# Patient Record
Sex: FEMALE | Race: WHITE | NOT HISPANIC OR LATINO
[De-identification: names, ages, dates, MRNs, and addresses within clinical notes are randomized per-mention and may not be internally consistent; named-entity substitution may affect disease eponyms.]

---

## 2019-09-23 ENCOUNTER — APPOINTMENT (OUTPATIENT)
Dept: NEUROSURGERY | Facility: CLINIC | Age: 69
End: 2019-09-23
Payer: MEDICARE

## 2019-09-23 VITALS
HEIGHT: 64 IN | DIASTOLIC BLOOD PRESSURE: 79 MMHG | SYSTOLIC BLOOD PRESSURE: 172 MMHG | BODY MASS INDEX: 22.2 KG/M2 | OXYGEN SATURATION: 96 % | WEIGHT: 130 LBS | RESPIRATION RATE: 17 BRPM | HEART RATE: 89 BPM | TEMPERATURE: 98.2 F

## 2019-09-23 PROCEDURE — 99204 OFFICE O/P NEW MOD 45 MIN: CPT

## 2019-10-07 ENCOUNTER — APPOINTMENT (OUTPATIENT)
Dept: NEUROSURGERY | Facility: CLINIC | Age: 69
End: 2019-10-07
Payer: MEDICARE

## 2019-10-07 ENCOUNTER — OUTPATIENT (OUTPATIENT)
Dept: OUTPATIENT SERVICES | Facility: HOSPITAL | Age: 69
LOS: 1 days | End: 2019-10-07
Payer: MEDICARE

## 2019-10-07 ENCOUNTER — RX CHANGE (OUTPATIENT)
Age: 69
End: 2019-10-07

## 2019-10-07 VITALS
BODY MASS INDEX: 22.2 KG/M2 | TEMPERATURE: 98.4 F | DIASTOLIC BLOOD PRESSURE: 91 MMHG | HEIGHT: 64 IN | SYSTOLIC BLOOD PRESSURE: 174 MMHG | HEART RATE: 100 BPM | OXYGEN SATURATION: 95 % | RESPIRATION RATE: 18 BRPM | WEIGHT: 130 LBS

## 2019-10-07 DIAGNOSIS — M48.061 SPINAL STENOSIS, LUMBAR REGION WITHOUT NEUROGENIC CLAUDICATION: ICD-10-CM

## 2019-10-07 PROCEDURE — 72082 X-RAY EXAM ENTIRE SPI 2/3 VW: CPT | Mod: 26

## 2019-10-07 PROCEDURE — 99214 OFFICE O/P EST MOD 30 MIN: CPT

## 2019-10-07 PROCEDURE — 72082 X-RAY EXAM ENTIRE SPI 2/3 VW: CPT

## 2019-10-15 ENCOUNTER — RX RENEWAL (OUTPATIENT)
Age: 69
End: 2019-10-15

## 2019-10-28 ENCOUNTER — APPOINTMENT (OUTPATIENT)
Dept: NEUROSURGERY | Facility: CLINIC | Age: 69
End: 2019-10-28
Payer: MEDICARE

## 2019-10-28 ENCOUNTER — OUTPATIENT (OUTPATIENT)
Dept: OUTPATIENT SERVICES | Facility: HOSPITAL | Age: 69
LOS: 1 days | End: 2019-10-28
Payer: MEDICARE

## 2019-10-28 VITALS
DIASTOLIC BLOOD PRESSURE: 83 MMHG | HEIGHT: 64 IN | TEMPERATURE: 98 F | WEIGHT: 136.91 LBS | RESPIRATION RATE: 14 BRPM | OXYGEN SATURATION: 99 % | SYSTOLIC BLOOD PRESSURE: 155 MMHG | HEART RATE: 68 BPM

## 2019-10-28 DIAGNOSIS — Z86.39 PERSONAL HISTORY OF OTHER ENDOCRINE, NUTRITIONAL AND METABOLIC DISEASE: ICD-10-CM

## 2019-10-28 DIAGNOSIS — Z96.659 PRESENCE OF UNSPECIFIED ARTIFICIAL KNEE JOINT: Chronic | ICD-10-CM

## 2019-10-28 DIAGNOSIS — Z98.49 CATARACT EXTRACTION STATUS, UNSPECIFIED EYE: Chronic | ICD-10-CM

## 2019-10-28 DIAGNOSIS — M48.061 SPINAL STENOSIS, LUMBAR REGION WITHOUT NEUROGENIC CLAUDICATION: ICD-10-CM

## 2019-10-28 DIAGNOSIS — Z98.890 OTHER SPECIFIED POSTPROCEDURAL STATES: Chronic | ICD-10-CM

## 2019-10-28 DIAGNOSIS — Z29.9 ENCOUNTER FOR PROPHYLACTIC MEASURES, UNSPECIFIED: ICD-10-CM

## 2019-10-28 DIAGNOSIS — Z78.9 OTHER SPECIFIED HEALTH STATUS: ICD-10-CM

## 2019-10-28 DIAGNOSIS — Z01.818 ENCOUNTER FOR OTHER PREPROCEDURAL EXAMINATION: ICD-10-CM

## 2019-10-28 DIAGNOSIS — Z98.51 TUBAL LIGATION STATUS: Chronic | ICD-10-CM

## 2019-10-28 LAB
ANION GAP SERPL CALC-SCNC: 11 MMOL/L — SIGNIFICANT CHANGE UP (ref 5–17)
BLD GP AB SCN SERPL QL: NEGATIVE — SIGNIFICANT CHANGE UP
BUN SERPL-MCNC: 13 MG/DL — SIGNIFICANT CHANGE UP (ref 7–23)
CALCIUM SERPL-MCNC: 10 MG/DL — SIGNIFICANT CHANGE UP (ref 8.4–10.5)
CHLORIDE SERPL-SCNC: 105 MMOL/L — SIGNIFICANT CHANGE UP (ref 96–108)
CO2 SERPL-SCNC: 23 MMOL/L — SIGNIFICANT CHANGE UP (ref 22–31)
CREAT SERPL-MCNC: 0.73 MG/DL — SIGNIFICANT CHANGE UP (ref 0.5–1.3)
GLUCOSE SERPL-MCNC: 80 MG/DL — SIGNIFICANT CHANGE UP (ref 70–99)
HCT VFR BLD CALC: 41.1 % — SIGNIFICANT CHANGE UP (ref 34.5–45)
HGB BLD-MCNC: 12.8 G/DL — SIGNIFICANT CHANGE UP (ref 11.5–15.5)
MCHC RBC-ENTMCNC: 29.4 PG — SIGNIFICANT CHANGE UP (ref 27–34)
MCHC RBC-ENTMCNC: 31.1 GM/DL — LOW (ref 32–36)
MCV RBC AUTO: 94.3 FL — SIGNIFICANT CHANGE UP (ref 80–100)
PLATELET # BLD AUTO: 321 K/UL — SIGNIFICANT CHANGE UP (ref 150–400)
POTASSIUM SERPL-MCNC: 4.3 MMOL/L — SIGNIFICANT CHANGE UP (ref 3.5–5.3)
POTASSIUM SERPL-SCNC: 4.3 MMOL/L — SIGNIFICANT CHANGE UP (ref 3.5–5.3)
RBC # BLD: 4.36 M/UL — SIGNIFICANT CHANGE UP (ref 3.8–5.2)
RBC # FLD: 14.6 % — HIGH (ref 10.3–14.5)
RH IG SCN BLD-IMP: POSITIVE — SIGNIFICANT CHANGE UP
SODIUM SERPL-SCNC: 139 MMOL/L — SIGNIFICANT CHANGE UP (ref 135–145)
WBC # BLD: 6.27 K/UL — SIGNIFICANT CHANGE UP (ref 3.8–10.5)
WBC # FLD AUTO: 6.27 K/UL — SIGNIFICANT CHANGE UP (ref 3.8–10.5)

## 2019-10-28 PROCEDURE — 86901 BLOOD TYPING SEROLOGIC RH(D): CPT

## 2019-10-28 PROCEDURE — G0463: CPT

## 2019-10-28 PROCEDURE — 87641 MR-STAPH DNA AMP PROBE: CPT

## 2019-10-28 PROCEDURE — 87640 STAPH A DNA AMP PROBE: CPT

## 2019-10-28 PROCEDURE — 99214 OFFICE O/P EST MOD 30 MIN: CPT

## 2019-10-28 PROCEDURE — 86850 RBC ANTIBODY SCREEN: CPT

## 2019-10-28 PROCEDURE — 86900 BLOOD TYPING SEROLOGIC ABO: CPT

## 2019-10-28 PROCEDURE — 80048 BASIC METABOLIC PNL TOTAL CA: CPT

## 2019-10-28 PROCEDURE — 85027 COMPLETE CBC AUTOMATED: CPT

## 2019-10-28 RX ORDER — CEFAZOLIN SODIUM 1 G
2000 VIAL (EA) INJECTION ONCE
Refills: 0 | Status: DISCONTINUED | OUTPATIENT
Start: 2019-11-06 | End: 2019-11-07

## 2019-10-28 NOTE — H&P PST ADULT - ATTENDING COMMENTS
Pt has significant back pain radiating to right hip.  Neuro intact.  Imaging with multilevel spondylosis with severe stenosis L45 and bilateral foraminal stenosis with Gr 1 spondy.  For L45 instrumentation and fusion, laminectomy.  Discussed with pt R/B/A in office at length.  Pain has only been moderately controlled on medication.  She agrees to proceed.

## 2019-10-28 NOTE — H&P PST ADULT - HISTORY OF PRESENT ILLNESS
70 yo female.   68 y/o female 4 years ago woke up with twinge in back and has gotten progressively worse. Last three years had many shots with pain management. the last neurologist 2 years ago says she needed a neurosurgeon. She does not take pain med. Severe in right gluteus down to the hip with electrical shocks that make her jump. In SI joint. Only on right side. last imaging was 2018. Only one epidural injection worked, lasted from Apri 2019 to July 2019. Has not yet seen a spine surgeon until last visit as she was afraid, and now she is ready to entertain the option. She tried gabapentin, did not help. CBD oil did not help. Tylenol and ibuprofen, neither really helps. She is getting through her chores. Used to be a . In am, pain is 10/10, Worse when rain.   Today, pt returns for follow up visit reporting intolerable pain. Need more meds. She is here for review of films and discussion of surgery.    174/91 (pt says she is in pain), T98.4F, 95%, P 100  Pt is walking sl bent over, but symmetrically,  Exam is stable.    9/26/19  MRI on disc and ZP website: severe L45 stenosis with severe bilateral NF stenosis. Multilevel spondylotic disease, L45 spondy, mostly unchanged from January.  CT LS spine on ZP site: Multilevel degen disease, mostly with soft tissue foraminal stenosis L45 and severe canal stenosis L45  LS spine flex/ex on ZP site with no change in L45 spondy, but has few mm reduction on CT scan when lying supine.    Pt with L45 stenosis and multilevel spondylosis. Given mild reduction of L45 Gr I spondy on CT scan when pt is supine and in forced extension, pt would need fusion. Plan to review imaging further to danny in on levels and procedure, which will include pedicle screws and interbody fusion. Discussed with patient some of risk/benefits. She strongly desires to proceed with surgery. She still needs a 36 in film and can have it done here at [facility] today. She request scheduling of surgery soon and will look into week around Nov 5. She will return on the same day of her pretesting (she lives on [facility]) for further discussion of surgery. Pt cell: [phone] 68 yo female. c/o progressively worsening low back pain with radiculopathy to right hip.    68 y/o female 4 years ago woke up with twinge in back and has gotten progressively worse. Last three years had many shots with pain management. the last neurologist 2 years ago says she needed a neurosurgeon. She does not take pain med. Severe in right gluteus down to the hip with electrical shocks that make her jump. In SI joint. Only on right side. last imaging was 2018. Only one epidural injection worked, lasted from Apri 2019 to July 2019. Has not yet seen a spine surgeon until last visit as she was afraid, and now she is ready to entertain the option. She tried gabapentin, did not help. CBD oil did not help. Tylenol and ibuprofen, neither really helps. She is getting through her chores. Used to be a . In am, pain is 10/10, Worse when rain.   Today, pt returns for follow up visit reporting intolerable pain. Need more meds. She is here for review of films and discussion of surgery.    174/91 (pt says she is in pain), T98.4F, 95%, P 100  Pt is walking sl bent over, but symmetrically,  Exam is stable.    9/26/19  MRI on disc and ZP website: severe L45 stenosis with severe bilateral NF stenosis. Multilevel spondylotic disease, L45 spondy, mostly unchanged from January.  CT LS spine on  site: Multilevel degen disease, mostly with soft tissue foraminal stenosis L45 and severe canal stenosis L45  LS spine flex/ex on ZP site with no change in L45 spondy, but has few mm reduction on CT scan when lying supine.    Pt with L45 stenosis and multilevel spondylosis. Given mild reduction of L45 Gr I spondy on CT scan when pt is supine and in forced extension, pt would need fusion. Plan to review imaging further to danny in on levels and procedure, which will include pedicle screws and interbody fusion. Discussed with patient some of risk/benefits. She strongly desires to proceed with surgery. She still needs a 36 in film and can have it done here at [facility] today. She request scheduling of surgery soon and will look into week around Nov 5. She will return on the same day of her pretesting (she lives on [facility]) for further discussion of surgery. Pt cell: [phone] 68 yo female. c/o progressively worsening low back pain with radiculopathy to right hip. pain is constant, 7-10/10,     70 y/o female 4 years ago woke up with twinge in back and has gotten progressively worse. Last three years had many shots with pain management. the last neurologist 2 years ago says she needed a neurosurgeon. She does not take pain med. Severe in right gluteus down to the hip with electrical shocks that make her jump. In SI joint. Only on right side. last imaging was 2018. Only one epidural injection worked, lasted from Apri 2019 to July 2019. Has not yet seen a spine surgeon until last visit as she was afraid, and now she is ready to entertain the option. She tried gabapentin, did not help. CBD oil did not help. Tylenol and ibuprofen, neither really helps. She is getting through her chores. Used to be a . In am, pain is 10/10, Worse when rain.   Today, pt returns for follow up visit reporting intolerable pain. Need more meds. She is here for review of films and discussion of surgery.    174/91 (pt says she is in pain), T98.4F, 95%, P 100  Pt is walking sl bent over, but symmetrically,  Exam is stable.    9/26/19  MRI on disc and Z website: severe L45 stenosis with severe bilateral NF stenosis. Multilevel spondylotic disease, L45 spondy, mostly unchanged from January.  CT LS spine on  site: Multilevel degen disease, mostly with soft tissue foraminal stenosis L45 and severe canal stenosis L45  LS spine flex/ex on ZP site with no change in L45 spondy, but has few mm reduction on CT scan when lying supine.    Pt with L45 stenosis and multilevel spondylosis. Given mild reduction of L45 Gr I spondy on CT scan when pt is supine and in forced extension, pt would need fusion. Plan to review imaging further to danny in on levels and procedure, which will include pedicle screws and interbody fusion. Discussed with patient some of risk/benefits. She strongly desires to proceed with surgery. She still needs a 36 in film and can have it done here at [facility] today. She request scheduling of surgery soon and will look into week around Nov 5. She will return on the same day of her pretesting (she lives on [facility]) for further discussion of surgery. Pt cell: [phone] 68 yo female. c/o progressively worsening low back pain with radiculopathy to right hip. pain is constant, 6-10/10, aggravated with movements, "it's pain". MRI revealed severe L45 stenosis with multilevel spondylosis. now presents to PST scheduled for laminectomy and fusion.

## 2019-10-28 NOTE — H&P PST ADULT - ACTIVITY
walk 45 minutes daily, able to walk up 4 flights of stairs walk 45 minutes daily, able to walk up 4 flights of stairs, houework

## 2019-10-28 NOTE — H&P PST ADULT - ASSESSMENT

## 2019-10-28 NOTE — H&P PST ADULT - NSICDXPASTSURGICALHX_GEN_ALL_CORE_FT
PAST SURGICAL HISTORY:  H/O shoulder surgery surgical repair for muscle tear, right  shoulder    H/O total knee replacement bilateral    H/O tubal ligation     S/P cataract extraction

## 2019-10-28 NOTE — H&P PST ADULT - NSICDXPROBLEM_GEN_ALL_CORE_FT
PROBLEM DIAGNOSES  Problem: Lumbar spinal stenosis  Assessment and Plan: L4-5 laminectomy  posterior fusion  instrumentation interbody fusion    Problem: Need for prophylactic measure  Assessment and Plan: The Caprini score indicates this patient is at risk for a VTE event (score 3-5).  Most surgical patients in this group would benefit from pharmacologic prophylaxis.  The surgical team will determine the balance between VTE risk and bleeding risk

## 2019-10-28 NOTE — H&P PST ADULT - NSANTHOSAYNRD_GEN_A_CORE
No. RODRI screening performed.  STOP BANG Legend: 0-2 = LOW Risk; 3-4 = INTERMEDIATE Risk; 5-8 = HIGH Risk

## 2019-10-29 VITALS
HEIGHT: 64 IN | BODY MASS INDEX: 22.2 KG/M2 | SYSTOLIC BLOOD PRESSURE: 153 MMHG | OXYGEN SATURATION: 98 % | DIASTOLIC BLOOD PRESSURE: 89 MMHG | WEIGHT: 130 LBS | TEMPERATURE: 98.2 F | HEART RATE: 88 BPM | RESPIRATION RATE: 17 BRPM

## 2019-10-29 LAB
MRSA PCR RESULT.: SIGNIFICANT CHANGE UP
S AUREUS DNA NOSE QL NAA+PROBE: SIGNIFICANT CHANGE UP

## 2019-10-30 NOTE — HISTORY OF PRESENT ILLNESS
[FreeTextEntry1] : Ms. SCOTTIE HOGAN is a 70 yo female with PMH of OS on b/l knee, HLD (no meds, diet control only), chronic back pain who was seen in the office since September 2019 for chronic back pain with failed conservative management. MRI L-spine showed multilevel lumbar stenosis most severe @ L4-L5 with spondylolisthesis at the same level and severe b/l foraminal stenosis. She returns today for pre op consultation and states her previous pain remained unchanged since the last office visit and she c/o occasional b/l toes numbness causing. She denies saddle anesthesia, bowel/bladder dysfunction or gait disturbance, LE weakness. She just had pre surgical testing today and scheduled for the surgery this Wednesday. She reports that she has not been taking blood thinner and stopped taking NSAIDs a week ago.  The pain benefit of Tramadol and gabapentin did not last but a few days.\par \par Pervious history of illness\par Patient reports  4 years ago woke up with twinge in back and has gotten progressively worse.  Last three years had many shots with pain management.  the last neurologist 2 years ago says she needed a neurosurgeon.  She does not take pain med.  Severe in right  gluteus down to the hip with electrical shocks that make her jump.  In SI joint.  Only on right side.  last imaging was 2018.  Only one epidural injection worked, lasted from Apri 2019 to July 2019.  Has not yet seen a spine surgeon until today as she was afraid, and now she is ready to entertain the option.  She tried gabapentin, did not help. CBD oil did not help.  Tylenol and ibuprofen, neither really helps.  She is getting through her chores. Used to be a \par In am, pain is 10/10, right now 7/10.  Worse when rain.

## 2019-10-30 NOTE — PHYSICAL EXAM
[Simms] : Simms's sign was not demonstrated [Straight-Leg Raise Test - Left] : straight leg raise of the left leg was negative [Straight-Leg Raise Test - Right] : straight leg raise  of the right leg was negative [FreeTextEntry1] : m

## 2019-10-30 NOTE — ASSESSMENT
[FreeTextEntry1] : This is a 70 yo female with severe lumbar central/bilateral foraminal stenosis with spondylolisthesis.  i have discussed her case and reviewed the films. Given her failed conservative management and long standing pain causing significant daily activity and image finding, surgical intervention (L4-L5 laminectomy, facetectomy, interbody fusion, posterior instrumentation and fusion) can be beneficial. I discussed with the patient in detail for risk (bleeding, infection, CSF leak which may extend hospital stay, transient LE weakness 2/2 nerve stretch, post op pain, possible adjacent disease which may guarantee additional surgery in the future), benefit and alternative to the surgery. I also explained to the patient that purpose of the surgery is to prevent further neurological damages/worsening symptoms and her pain may not be completely relieved. Patient, at first was taken aback by the complexity of the case and the various potential risks, but we discussed the need to explain procedure through informed consent and set expectations prior to embarking upon surgical management.  She verbalizes understanding and would like to proceed the surgery.\par

## 2019-10-30 NOTE — RESULTS/DATA
[FreeTextEntry1] : MRI 9/26/19 outside on Disc ([facility]) was unchanged from January.\par 10/7/19 scoliosis \par SVA: 56.7 mm (0/+)\par PI: 36.47 deg\par LL: 43.8 deg\par PT: 78.9 deg (++) ??\par PI-LL: -7.3 deg (0)\par

## 2019-11-05 ENCOUNTER — TRANSCRIPTION ENCOUNTER (OUTPATIENT)
Age: 69
End: 2019-11-05

## 2019-11-06 ENCOUNTER — INPATIENT (INPATIENT)
Facility: HOSPITAL | Age: 69
LOS: 3 days | Discharge: HOME | DRG: 454 | End: 2019-11-10
Attending: NEUROLOGICAL SURGERY | Admitting: NEUROLOGICAL SURGERY
Payer: MEDICARE

## 2019-11-06 ENCOUNTER — APPOINTMENT (OUTPATIENT)
Dept: NEUROSURGERY | Facility: CLINIC | Age: 69
End: 2019-11-06
Payer: MEDICARE

## 2019-11-06 ENCOUNTER — RESULT REVIEW (OUTPATIENT)
Age: 69
End: 2019-11-06

## 2019-11-06 VITALS
DIASTOLIC BLOOD PRESSURE: 88 MMHG | HEIGHT: 64 IN | HEART RATE: 87 BPM | OXYGEN SATURATION: 100 % | SYSTOLIC BLOOD PRESSURE: 169 MMHG | TEMPERATURE: 98 F | RESPIRATION RATE: 16 BRPM | WEIGHT: 136.91 LBS

## 2019-11-06 DIAGNOSIS — Z98.51 TUBAL LIGATION STATUS: Chronic | ICD-10-CM

## 2019-11-06 DIAGNOSIS — Z98.890 OTHER SPECIFIED POSTPROCEDURAL STATES: Chronic | ICD-10-CM

## 2019-11-06 DIAGNOSIS — M48.061 SPINAL STENOSIS, LUMBAR REGION WITHOUT NEUROGENIC CLAUDICATION: ICD-10-CM

## 2019-11-06 DIAGNOSIS — Z98.49 CATARACT EXTRACTION STATUS, UNSPECIFIED EYE: Chronic | ICD-10-CM

## 2019-11-06 DIAGNOSIS — Z96.659 PRESENCE OF UNSPECIFIED ARTIFICIAL KNEE JOINT: Chronic | ICD-10-CM

## 2019-11-06 LAB
ANION GAP SERPL CALC-SCNC: 12 MMOL/L — SIGNIFICANT CHANGE UP (ref 5–17)
BASOPHILS # BLD AUTO: 0 K/UL — SIGNIFICANT CHANGE UP (ref 0–0.2)
BASOPHILS NFR BLD AUTO: 0 % — SIGNIFICANT CHANGE UP (ref 0–2)
BUN SERPL-MCNC: 16 MG/DL — SIGNIFICANT CHANGE UP (ref 7–23)
CALCIUM SERPL-MCNC: 8.8 MG/DL — SIGNIFICANT CHANGE UP (ref 8.4–10.5)
CHLORIDE SERPL-SCNC: 104 MMOL/L — SIGNIFICANT CHANGE UP (ref 96–108)
CO2 SERPL-SCNC: 21 MMOL/L — LOW (ref 22–31)
CREAT SERPL-MCNC: 0.7 MG/DL — SIGNIFICANT CHANGE UP (ref 0.5–1.3)
EOSINOPHIL # BLD AUTO: 0 K/UL — SIGNIFICANT CHANGE UP (ref 0–0.5)
EOSINOPHIL NFR BLD AUTO: 0 % — SIGNIFICANT CHANGE UP (ref 0–6)
GAS PNL BLDA: SIGNIFICANT CHANGE UP
GLUCOSE SERPL-MCNC: 145 MG/DL — HIGH (ref 70–99)
HCT VFR BLD CALC: 27.1 % — LOW (ref 34.5–45)
HGB BLD-MCNC: 8.7 G/DL — LOW (ref 11.5–15.5)
IMM GRANULOCYTES NFR BLD AUTO: 0.5 % — SIGNIFICANT CHANGE UP (ref 0–1.5)
LYMPHOCYTES # BLD AUTO: 0.63 K/UL — LOW (ref 1–3.3)
LYMPHOCYTES # BLD AUTO: 7.9 % — LOW (ref 13–44)
MCHC RBC-ENTMCNC: 29.3 PG — SIGNIFICANT CHANGE UP (ref 27–34)
MCHC RBC-ENTMCNC: 32.1 GM/DL — SIGNIFICANT CHANGE UP (ref 32–36)
MCV RBC AUTO: 91.2 FL — SIGNIFICANT CHANGE UP (ref 80–100)
MONOCYTES # BLD AUTO: 0.29 K/UL — SIGNIFICANT CHANGE UP (ref 0–0.9)
MONOCYTES NFR BLD AUTO: 3.6 % — SIGNIFICANT CHANGE UP (ref 2–14)
NEUTROPHILS # BLD AUTO: 6.99 K/UL — SIGNIFICANT CHANGE UP (ref 1.8–7.4)
NEUTROPHILS NFR BLD AUTO: 88 % — HIGH (ref 43–77)
NRBC # BLD: 0 /100 WBCS — SIGNIFICANT CHANGE UP (ref 0–0)
PLATELET # BLD AUTO: 216 K/UL — SIGNIFICANT CHANGE UP (ref 150–400)
POTASSIUM SERPL-MCNC: 4.3 MMOL/L — SIGNIFICANT CHANGE UP (ref 3.5–5.3)
POTASSIUM SERPL-SCNC: 4.3 MMOL/L — SIGNIFICANT CHANGE UP (ref 3.5–5.3)
RBC # BLD: 2.97 M/UL — LOW (ref 3.8–5.2)
RBC # FLD: 13.8 % — SIGNIFICANT CHANGE UP (ref 10.3–14.5)
RH IG SCN BLD-IMP: POSITIVE — SIGNIFICANT CHANGE UP
SODIUM SERPL-SCNC: 137 MMOL/L — SIGNIFICANT CHANGE UP (ref 135–145)
WBC # BLD: 7.95 K/UL — SIGNIFICANT CHANGE UP (ref 3.8–10.5)
WBC # FLD AUTO: 7.95 K/UL — SIGNIFICANT CHANGE UP (ref 3.8–10.5)

## 2019-11-06 PROCEDURE — 22840 INSERT SPINE FIXATION DEVICE: CPT

## 2019-11-06 PROCEDURE — 63047 LAM FACETEC & FORAMOT LUMBAR: CPT | Mod: 59

## 2019-11-06 PROCEDURE — 22633 ARTHRD CMBN 1NTRSPC LUMBAR: CPT

## 2019-11-06 PROCEDURE — 88304 TISSUE EXAM BY PATHOLOGIST: CPT | Mod: 26

## 2019-11-06 PROCEDURE — 88311 DECALCIFY TISSUE: CPT | Mod: 26

## 2019-11-06 PROCEDURE — 22853 INSJ BIOMECHANICAL DEVICE: CPT

## 2019-11-06 RX ORDER — DIAZEPAM 5 MG
5 TABLET ORAL EVERY 8 HOURS
Refills: 0 | Status: DISCONTINUED | OUTPATIENT
Start: 2019-11-06 | End: 2019-11-08

## 2019-11-06 RX ORDER — ONDANSETRON 8 MG/1
4 TABLET, FILM COATED ORAL EVERY 6 HOURS
Refills: 0 | Status: DISCONTINUED | OUTPATIENT
Start: 2019-11-06 | End: 2019-11-06

## 2019-11-06 RX ORDER — SENNA PLUS 8.6 MG/1
2 TABLET ORAL AT BEDTIME
Refills: 0 | Status: DISCONTINUED | OUTPATIENT
Start: 2019-11-06 | End: 2019-11-10

## 2019-11-06 RX ORDER — GABAPENTIN 400 MG/1
400 CAPSULE ORAL THREE TIMES A DAY
Refills: 0 | Status: DISCONTINUED | OUTPATIENT
Start: 2019-11-06 | End: 2019-11-10

## 2019-11-06 RX ORDER — ACETAMINOPHEN 500 MG
650 TABLET ORAL EVERY 6 HOURS
Refills: 0 | Status: DISCONTINUED | OUTPATIENT
Start: 2019-11-06 | End: 2019-11-10

## 2019-11-06 RX ORDER — LIDOCAINE HCL 20 MG/ML
0.2 VIAL (ML) INJECTION ONCE
Refills: 0 | Status: DISCONTINUED | OUTPATIENT
Start: 2019-11-06 | End: 2019-11-06

## 2019-11-06 RX ORDER — FOLIC ACID 0.8 MG
1 TABLET ORAL DAILY
Refills: 0 | Status: DISCONTINUED | OUTPATIENT
Start: 2019-11-06 | End: 2019-11-10

## 2019-11-06 RX ORDER — ONDANSETRON 8 MG/1
4 TABLET, FILM COATED ORAL EVERY 6 HOURS
Refills: 0 | Status: DISCONTINUED | OUTPATIENT
Start: 2019-11-06 | End: 2019-11-07

## 2019-11-06 RX ORDER — HYDROMORPHONE HYDROCHLORIDE 2 MG/ML
30 INJECTION INTRAMUSCULAR; INTRAVENOUS; SUBCUTANEOUS
Refills: 0 | Status: DISCONTINUED | OUTPATIENT
Start: 2019-11-06 | End: 2019-11-07

## 2019-11-06 RX ORDER — SODIUM CHLORIDE 9 MG/ML
3 INJECTION INTRAMUSCULAR; INTRAVENOUS; SUBCUTANEOUS EVERY 8 HOURS
Refills: 0 | Status: DISCONTINUED | OUTPATIENT
Start: 2019-11-06 | End: 2019-11-06

## 2019-11-06 RX ORDER — CEFAZOLIN SODIUM 1 G
2000 VIAL (EA) INJECTION EVERY 8 HOURS
Refills: 0 | Status: COMPLETED | OUTPATIENT
Start: 2019-11-06 | End: 2019-11-07

## 2019-11-06 RX ORDER — NALOXONE HYDROCHLORIDE 4 MG/.1ML
0.1 SPRAY NASAL
Refills: 0 | Status: DISCONTINUED | OUTPATIENT
Start: 2019-11-06 | End: 2019-11-07

## 2019-11-06 RX ORDER — ACETAMINOPHEN 500 MG
1000 TABLET ORAL ONCE
Refills: 0 | Status: COMPLETED | OUTPATIENT
Start: 2019-11-06 | End: 2019-11-06

## 2019-11-06 RX ORDER — HYDROMORPHONE HYDROCHLORIDE 2 MG/ML
0.5 INJECTION INTRAMUSCULAR; INTRAVENOUS; SUBCUTANEOUS
Refills: 0 | Status: DISCONTINUED | OUTPATIENT
Start: 2019-11-06 | End: 2019-11-07

## 2019-11-06 RX ORDER — HYDROMORPHONE HYDROCHLORIDE 2 MG/ML
0.25 INJECTION INTRAMUSCULAR; INTRAVENOUS; SUBCUTANEOUS
Refills: 0 | Status: DISCONTINUED | OUTPATIENT
Start: 2019-11-06 | End: 2019-11-07

## 2019-11-06 RX ORDER — POLYETHYLENE GLYCOL 3350 17 G/17G
17 POWDER, FOR SOLUTION ORAL DAILY
Refills: 0 | Status: DISCONTINUED | OUTPATIENT
Start: 2019-11-06 | End: 2019-11-09

## 2019-11-06 RX ORDER — CHLORHEXIDINE GLUCONATE 213 G/1000ML
1 SOLUTION TOPICAL ONCE
Refills: 0 | Status: DISCONTINUED | OUTPATIENT
Start: 2019-11-06 | End: 2019-11-07

## 2019-11-06 RX ORDER — SODIUM CHLORIDE 9 MG/ML
1000 INJECTION INTRAMUSCULAR; INTRAVENOUS; SUBCUTANEOUS
Refills: 0 | Status: DISCONTINUED | OUTPATIENT
Start: 2019-11-06 | End: 2019-11-07

## 2019-11-06 RX ADMIN — HYDROMORPHONE HYDROCHLORIDE 0.25 MILLIGRAM(S): 2 INJECTION INTRAMUSCULAR; INTRAVENOUS; SUBCUTANEOUS at 20:37

## 2019-11-06 RX ADMIN — SODIUM CHLORIDE 75 MILLILITER(S): 9 INJECTION INTRAMUSCULAR; INTRAVENOUS; SUBCUTANEOUS at 19:44

## 2019-11-06 RX ADMIN — HYDROMORPHONE HYDROCHLORIDE 30 MILLILITER(S): 2 INJECTION INTRAMUSCULAR; INTRAVENOUS; SUBCUTANEOUS at 20:15

## 2019-11-06 RX ADMIN — HYDROMORPHONE HYDROCHLORIDE 0.25 MILLIGRAM(S): 2 INJECTION INTRAMUSCULAR; INTRAVENOUS; SUBCUTANEOUS at 19:45

## 2019-11-06 RX ADMIN — HYDROMORPHONE HYDROCHLORIDE 0.25 MILLIGRAM(S): 2 INJECTION INTRAMUSCULAR; INTRAVENOUS; SUBCUTANEOUS at 20:56

## 2019-11-06 RX ADMIN — Medication 5 MILLIGRAM(S): at 20:06

## 2019-11-06 RX ADMIN — Medication 400 MILLIGRAM(S): at 19:30

## 2019-11-06 RX ADMIN — HYDROMORPHONE HYDROCHLORIDE 0.25 MILLIGRAM(S): 2 INJECTION INTRAMUSCULAR; INTRAVENOUS; SUBCUTANEOUS at 19:20

## 2019-11-06 RX ADMIN — Medication 100 MILLIGRAM(S): at 21:02

## 2019-11-06 RX ADMIN — GABAPENTIN 400 MILLIGRAM(S): 400 CAPSULE ORAL at 21:50

## 2019-11-06 RX ADMIN — Medication 1000 MILLIGRAM(S): at 19:47

## 2019-11-06 RX ADMIN — HYDROMORPHONE HYDROCHLORIDE 0.25 MILLIGRAM(S): 2 INJECTION INTRAMUSCULAR; INTRAVENOUS; SUBCUTANEOUS at 19:47

## 2019-11-06 RX ADMIN — HYDROMORPHONE HYDROCHLORIDE 0.25 MILLIGRAM(S): 2 INJECTION INTRAMUSCULAR; INTRAVENOUS; SUBCUTANEOUS at 20:00

## 2019-11-07 DIAGNOSIS — D62 ACUTE POSTHEMORRHAGIC ANEMIA: ICD-10-CM

## 2019-11-07 DIAGNOSIS — K59.00 CONSTIPATION, UNSPECIFIED: ICD-10-CM

## 2019-11-07 DIAGNOSIS — R52 PAIN, UNSPECIFIED: ICD-10-CM

## 2019-11-07 DIAGNOSIS — M47.816 SPONDYLOSIS WITHOUT MYELOPATHY OR RADICULOPATHY, LUMBAR REGION: ICD-10-CM

## 2019-11-07 LAB
ANION GAP SERPL CALC-SCNC: 14 MMOL/L — SIGNIFICANT CHANGE UP (ref 5–17)
BUN SERPL-MCNC: 13 MG/DL — SIGNIFICANT CHANGE UP (ref 7–23)
CALCIUM SERPL-MCNC: 8.8 MG/DL — SIGNIFICANT CHANGE UP (ref 8.4–10.5)
CHLORIDE SERPL-SCNC: 105 MMOL/L — SIGNIFICANT CHANGE UP (ref 96–108)
CO2 SERPL-SCNC: 21 MMOL/L — LOW (ref 22–31)
CREAT SERPL-MCNC: 0.61 MG/DL — SIGNIFICANT CHANGE UP (ref 0.5–1.3)
GLUCOSE SERPL-MCNC: 158 MG/DL — HIGH (ref 70–99)
HCT VFR BLD CALC: 26.3 % — LOW (ref 34.5–45)
HGB BLD-MCNC: 8.7 G/DL — LOW (ref 11.5–15.5)
MCHC RBC-ENTMCNC: 29.7 PG — SIGNIFICANT CHANGE UP (ref 27–34)
MCHC RBC-ENTMCNC: 33.1 GM/DL — SIGNIFICANT CHANGE UP (ref 32–36)
MCV RBC AUTO: 89.8 FL — SIGNIFICANT CHANGE UP (ref 80–100)
NRBC # BLD: 0 /100 WBCS — SIGNIFICANT CHANGE UP (ref 0–0)
PLATELET # BLD AUTO: 243 K/UL — SIGNIFICANT CHANGE UP (ref 150–400)
POTASSIUM SERPL-MCNC: 4.3 MMOL/L — SIGNIFICANT CHANGE UP (ref 3.5–5.3)
POTASSIUM SERPL-SCNC: 4.3 MMOL/L — SIGNIFICANT CHANGE UP (ref 3.5–5.3)
RBC # BLD: 2.93 M/UL — LOW (ref 3.8–5.2)
RBC # FLD: 13.7 % — SIGNIFICANT CHANGE UP (ref 10.3–14.5)
SODIUM SERPL-SCNC: 140 MMOL/L — SIGNIFICANT CHANGE UP (ref 135–145)
WBC # BLD: 10.56 K/UL — HIGH (ref 3.8–10.5)
WBC # FLD AUTO: 10.56 K/UL — HIGH (ref 3.8–10.5)

## 2019-11-07 PROCEDURE — 99223 1ST HOSP IP/OBS HIGH 75: CPT

## 2019-11-07 RX ORDER — TRAMADOL HYDROCHLORIDE 50 MG/1
50 TABLET ORAL EVERY 6 HOURS
Refills: 0 | Status: DISCONTINUED | OUTPATIENT
Start: 2019-11-07 | End: 2019-11-07

## 2019-11-07 RX ORDER — DICLOFENAC SODIUM 30 MG/G
1 GEL TOPICAL
Qty: 0 | Refills: 0 | DISCHARGE

## 2019-11-07 RX ORDER — HYDROMORPHONE HYDROCHLORIDE 2 MG/ML
0.5 INJECTION INTRAMUSCULAR; INTRAVENOUS; SUBCUTANEOUS EVERY 4 HOURS
Refills: 0 | Status: DISCONTINUED | OUTPATIENT
Start: 2019-11-07 | End: 2019-11-07

## 2019-11-07 RX ORDER — TRAMADOL HYDROCHLORIDE 50 MG/1
50 TABLET ORAL EVERY 4 HOURS
Refills: 0 | Status: DISCONTINUED | OUTPATIENT
Start: 2019-11-07 | End: 2019-11-07

## 2019-11-07 RX ORDER — LACTULOSE 10 G/15ML
20 SOLUTION ORAL
Refills: 0 | Status: DISCONTINUED | OUTPATIENT
Start: 2019-11-07 | End: 2019-11-08

## 2019-11-07 RX ORDER — TRAMADOL HYDROCHLORIDE 50 MG/1
50 TABLET ORAL EVERY 4 HOURS
Refills: 0 | Status: DISCONTINUED | OUTPATIENT
Start: 2019-11-07 | End: 2019-11-10

## 2019-11-07 RX ORDER — HYDROMORPHONE HYDROCHLORIDE 2 MG/ML
1 INJECTION INTRAMUSCULAR; INTRAVENOUS; SUBCUTANEOUS EVERY 4 HOURS
Refills: 0 | Status: DISCONTINUED | OUTPATIENT
Start: 2019-11-07 | End: 2019-11-07

## 2019-11-07 RX ORDER — ACETAMINOPHEN 500 MG
2 TABLET ORAL
Qty: 0 | Refills: 0 | DISCHARGE

## 2019-11-07 RX ORDER — OXYCODONE HYDROCHLORIDE 5 MG/1
2.5 TABLET ORAL EVERY 4 HOURS
Refills: 0 | Status: DISCONTINUED | OUTPATIENT
Start: 2019-11-07 | End: 2019-11-07

## 2019-11-07 RX ORDER — TRAMADOL HYDROCHLORIDE 50 MG/1
25 TABLET ORAL EVERY 4 HOURS
Refills: 0 | Status: DISCONTINUED | OUTPATIENT
Start: 2019-11-07 | End: 2019-11-07

## 2019-11-07 RX ORDER — TRAMADOL HYDROCHLORIDE 50 MG/1
25 TABLET ORAL EVERY 4 HOURS
Refills: 0 | Status: DISCONTINUED | OUTPATIENT
Start: 2019-11-07 | End: 2019-11-10

## 2019-11-07 RX ORDER — PREGABALIN 225 MG/1
1 CAPSULE ORAL
Qty: 0 | Refills: 0 | DISCHARGE

## 2019-11-07 RX ORDER — OXYCODONE HYDROCHLORIDE 5 MG/1
5 TABLET ORAL EVERY 4 HOURS
Refills: 0 | Status: DISCONTINUED | OUTPATIENT
Start: 2019-11-07 | End: 2019-11-07

## 2019-11-07 RX ORDER — GABAPENTIN 400 MG/1
400 CAPSULE ORAL
Qty: 0 | Refills: 0 | DISCHARGE

## 2019-11-07 RX ORDER — ASCORBIC ACID 60 MG
1 TABLET,CHEWABLE ORAL
Qty: 0 | Refills: 0 | DISCHARGE

## 2019-11-07 RX ORDER — ENOXAPARIN SODIUM 100 MG/ML
40 INJECTION SUBCUTANEOUS
Refills: 0 | Status: DISCONTINUED | OUTPATIENT
Start: 2019-11-07 | End: 2019-11-08

## 2019-11-07 RX ADMIN — Medication 1 TABLET(S): at 11:45

## 2019-11-07 RX ADMIN — TRAMADOL HYDROCHLORIDE 50 MILLIGRAM(S): 50 TABLET ORAL at 15:00

## 2019-11-07 RX ADMIN — SENNA PLUS 2 TABLET(S): 8.6 TABLET ORAL at 22:07

## 2019-11-07 RX ADMIN — Medication 1 TABLET(S): at 14:12

## 2019-11-07 RX ADMIN — Medication 650 MILLIGRAM(S): at 12:15

## 2019-11-07 RX ADMIN — Medication 1 MILLIGRAM(S): at 11:45

## 2019-11-07 RX ADMIN — GABAPENTIN 400 MILLIGRAM(S): 400 CAPSULE ORAL at 06:32

## 2019-11-07 RX ADMIN — ENOXAPARIN SODIUM 40 MILLIGRAM(S): 100 INJECTION SUBCUTANEOUS at 18:05

## 2019-11-07 RX ADMIN — TRAMADOL HYDROCHLORIDE 50 MILLIGRAM(S): 50 TABLET ORAL at 22:37

## 2019-11-07 RX ADMIN — Medication 100 MILLIGRAM(S): at 04:40

## 2019-11-07 RX ADMIN — GABAPENTIN 400 MILLIGRAM(S): 400 CAPSULE ORAL at 22:06

## 2019-11-07 RX ADMIN — Medication 1 TABLET(S): at 06:32

## 2019-11-07 RX ADMIN — GABAPENTIN 400 MILLIGRAM(S): 400 CAPSULE ORAL at 14:12

## 2019-11-07 RX ADMIN — TRAMADOL HYDROCHLORIDE 50 MILLIGRAM(S): 50 TABLET ORAL at 18:30

## 2019-11-07 RX ADMIN — TRAMADOL HYDROCHLORIDE 50 MILLIGRAM(S): 50 TABLET ORAL at 14:12

## 2019-11-07 RX ADMIN — Medication 650 MILLIGRAM(S): at 11:44

## 2019-11-07 RX ADMIN — HYDROMORPHONE HYDROCHLORIDE 30 MILLILITER(S): 2 INJECTION INTRAMUSCULAR; INTRAVENOUS; SUBCUTANEOUS at 03:42

## 2019-11-07 RX ADMIN — TRAMADOL HYDROCHLORIDE 50 MILLIGRAM(S): 50 TABLET ORAL at 22:07

## 2019-11-07 RX ADMIN — Medication 1 TABLET(S): at 22:07

## 2019-11-07 RX ADMIN — Medication 5 MILLIGRAM(S): at 03:37

## 2019-11-07 RX ADMIN — HYDROMORPHONE HYDROCHLORIDE 30 MILLILITER(S): 2 INJECTION INTRAMUSCULAR; INTRAVENOUS; SUBCUTANEOUS at 07:54

## 2019-11-07 RX ADMIN — HYDROMORPHONE HYDROCHLORIDE 30 MILLILITER(S): 2 INJECTION INTRAMUSCULAR; INTRAVENOUS; SUBCUTANEOUS at 06:47

## 2019-11-07 RX ADMIN — TRAMADOL HYDROCHLORIDE 50 MILLIGRAM(S): 50 TABLET ORAL at 18:04

## 2019-11-07 NOTE — PHYSICAL THERAPY INITIAL EVALUATION ADULT - PLANNED THERAPY INTERVENTIONS, PT EVAL
gait training/transfer training/GOAL: Pt will independently negotiate stairs with 1 handrail with step over step pattern in 2wks./strengthening/balance training

## 2019-11-07 NOTE — PROVIDER CONTACT NOTE (OTHER) - SITUATION
Pt. refused to have guevara removed., Pt was informed that guevara would then be removed in am.  Pt. stated that she does not want it removed in the morning either.

## 2019-11-07 NOTE — PHYSICAL THERAPY INITIAL EVALUATION ADULT - ADDITIONAL COMMENTS
Pt lives with spouse in private home, 3 steps to enter, 4 flights of stairs inside home. Prior to admission pt was independent with all functional mobility. Pt states  will assist her on d/c home.

## 2019-11-07 NOTE — PROGRESS NOTE ADULT - SUBJECTIVE AND OBJECTIVE BOX
Day 1 of Anesthesia Pain Management Service    SUBJECTIVE: Doing well. I want to get rid of this    Pain Scale Score:	[X] Refer to charted pain scores    THERAPY:    [ ] IV PCA Morphine		        [ ] 5 mg/mL	[ ] 1 mg/mL  [X] IV PCA Hydromorphone	[ ] 5 mg/mL	[X] 1 mg/mL  [ ] IV PCA Fentanyl		        [ ] 50 micrograms/mL    Demand dose: 0.2 mg     Lockout: 6 minutes   Continuous Rate: 0 mg/hr  4 Hour Limit: 4 mg    MEDICATIONS  (STANDING):  calcium carbonate 1250 mG  + Vitamin D (OsCal 500 + D) 1 Tablet(s) Oral three times a day  ceFAZolin   IVPB 2000 milliGRAM(s) IV Intermittent once  chlorhexidine 2% Cloths 1 Application(s) Topical once  folic acid 1 milliGRAM(s) Oral daily  gabapentin 400 milliGRAM(s) Oral three times a day  HYDROmorphone PCA (1 mG/mL) 30 milliLiter(s) PCA Continuous PCA Continuous  multivitamin 1 Tablet(s) Oral daily  polyethylene glycol 3350 17 Gram(s) Oral daily  senna 2 Tablet(s) Oral at bedtime  sodium chloride 0.9%. 1000 milliLiter(s) (75 mL/Hr) IV Continuous <Continuous>    MEDICATIONS  (PRN):  acetaminophen   Tablet .. 650 milliGRAM(s) Oral every 6 hours PRN Mild Pain (1 - 3)  diazepam    Tablet 5 milliGRAM(s) Oral every 8 hours PRN Muscle spasms  HYDROmorphone PCA (1 mG/mL) Rescue Clinician Bolus 0.5 milliGRAM(s) IV Push every 15 minutes PRN for Pain Scale GREATER THAN 6  naloxone Injectable 0.1 milliGRAM(s) IV Push every 3 minutes PRN For ANY of the following changes in patient status:  A. RR LESS THAN 10 breaths per minute, B. Oxygen saturation LESS THAN 90%, C. Sedation score of 6  ondansetron Injectable 4 milliGRAM(s) IV Push every 6 hours PRN Nausea      OBJECTIVE:    Sedation Score:	[ X] Alert	 [ ] Drowsy 	[ ] Arousable	[ ] Asleep	[ ] Unresponsive    Side Effects:	[X ] None	[ ] Nausea	[ ] Vomiting	[ ] Pruritus  		[ ] Other:    Vital Signs Last 24 Hrs  T(C): 37 (07 Nov 2019 08:47), Max: 37.1 (07 Nov 2019 05:00)  T(F): 98.6 (07 Nov 2019 08:47), Max: 98.8 (07 Nov 2019 05:00)  HR: 88 (07 Nov 2019 08:47) (74 - 114)  BP: 125/56 (07 Nov 2019 08:47) (117/62 - 169/88)  BP(mean): 91 (07 Nov 2019 04:00) (85 - 113)  RR: 16 (07 Nov 2019 08:47) (16 - 18)  SpO2: 98% (07 Nov 2019 08:47) (95% - 100%)    ASSESSMENT/ PLAN    Therapy to  be:               [  ] Continued   [X ] Discontinued   [ X] Changed to PRN Analgesics    Documentation and Verification of current medications:   [X] Done	[ ] Not done, not eligible    Comments: Day 1 of Anesthesia Pain Management Service    SUBJECTIVE: Doing well. I want to get rid of this    Pain Scale Score:	[X] Refer to charted pain scores    THERAPY:    [ ] IV PCA Morphine		        [ ] 5 mg/mL	[ ] 1 mg/mL  [X] IV PCA Hydromorphone	[ ] 5 mg/mL	[X] 1 mg/mL  [ ] IV PCA Fentanyl		        [ ] 50 micrograms/mL    Demand dose: 0.2 mg     Lockout: 6 minutes   Continuous Rate: 0 mg/hr  4 Hour Limit: 4 mg    MEDICATIONS  (STANDING):  calcium carbonate 1250 mG  + Vitamin D (OsCal 500 + D) 1 Tablet(s) Oral three times a day  ceFAZolin   IVPB 2000 milliGRAM(s) IV Intermittent once  chlorhexidine 2% Cloths 1 Application(s) Topical once  folic acid 1 milliGRAM(s) Oral daily  gabapentin 400 milliGRAM(s) Oral three times a day  HYDROmorphone PCA (1 mG/mL) 30 milliLiter(s) PCA Continuous PCA Continuous  multivitamin 1 Tablet(s) Oral daily  polyethylene glycol 3350 17 Gram(s) Oral daily  senna 2 Tablet(s) Oral at bedtime  sodium chloride 0.9%. 1000 milliLiter(s) (75 mL/Hr) IV Continuous <Continuous>    MEDICATIONS  (PRN):  acetaminophen   Tablet .. 650 milliGRAM(s) Oral every 6 hours PRN Mild Pain (1 - 3)  diazepam    Tablet 5 milliGRAM(s) Oral every 8 hours PRN Muscle spasms  HYDROmorphone PCA (1 mG/mL) Rescue Clinician Bolus 0.5 milliGRAM(s) IV Push every 15 minutes PRN for Pain Scale GREATER THAN 6  naloxone Injectable 0.1 milliGRAM(s) IV Push every 3 minutes PRN For ANY of the following changes in patient status:  A. RR LESS THAN 10 breaths per minute, B. Oxygen saturation LESS THAN 90%, C. Sedation score of 6  ondansetron Injectable 4 milliGRAM(s) IV Push every 6 hours PRN Nausea      OBJECTIVE:    Sedation Score:	[ X] Alert	 [ ] Drowsy 	[ ] Arousable	[ ] Asleep	[ ] Unresponsive    Side Effects:	[X ] None	[ ] Nausea	[ ] Vomiting	[ ] Pruritus  		[ ] Other:    Vital Signs Last 24 Hrs  T(C): 37 (07 Nov 2019 08:47), Max: 37.1 (07 Nov 2019 05:00)  T(F): 98.6 (07 Nov 2019 08:47), Max: 98.8 (07 Nov 2019 05:00)  HR: 88 (07 Nov 2019 08:47) (74 - 114)  BP: 125/56 (07 Nov 2019 08:47) (117/62 - 169/88)  BP(mean): 91 (07 Nov 2019 04:00) (85 - 113)  RR: 16 (07 Nov 2019 08:47) (16 - 18)  SpO2: 98% (07 Nov 2019 08:47) (95% - 100%)    ASSESSMENT/ PLAN    Therapy to  be:               [  ] Continued   [X ] Discontinued   [ X] Changed to PRN Analgesics    Documentation and Verification of current medications:   [X] Done	[ ] Not done, not eligible    Comments: Minimal use. Requesting PCA be d\c'd Transition to po analgesics prn

## 2019-11-07 NOTE — CONSULT NOTE ADULT - SUBJECTIVE AND OBJECTIVE BOX
Varun Tyson  Pager [phone]  Office [phone]    CC:   HPI:  70 yo female. c/o progressively worsening low back pain with radiculopathy to right hip. pain is constant, 6-10/10, aggravated with movements, "it's pain". MRI revealed severe L45 stenosis with multilevel spondylosis. now presents to PST scheduled for laminectomy and fusion. (28 Oct 2019 11:00)      PAST MEDICAL & SURGICAL HISTORY:  Lumbar spondylosis  S/P cataract extraction  H/O shoulder surgery: surgical repair for muscle tear, right  shoulder  H/O tubal ligation  H/O total knee replacement: bilateral      Review of Systems:   CONSTITUTIONAL: No fever, weight loss, or fatigue  EYES: No eye pain, visual disturbances, or discharge  ENMT:  No difficulty hearing, tinnitus, vertigo; No sinus or throat pain  NECK: No pain or stiffness  BREASTS: No pain, masses, or nipple discharge  RESPIRATORY: No cough, wheezing, chills or hemoptysis; No shortness of breath  CARDIOVASCULAR: No chest pain, palpitations, dizziness, or leg swelling  GASTROINTESTINAL: No abdominal or epigastric pain. No nausea, vomiting, or hematemesis; No diarrhea or constipation. No melena or hematochezia.  GENITOURINARY: No dysuria, frequency, hematuria, or incontinence  NEUROLOGICAL: No headaches, memory loss, loss of strength, numbness, or tremors  SKIN: No itching, burning, rashes, or lesions   LYMPH NODES: No enlarged glands  ENDOCRINE: No heat or cold intolerance; No hair loss  MUSCULOSKELETAL: No joint pain or swelling; No muscle, back, or extremity pain  PSYCHIATRIC: No depression, anxiety, mood swings, or difficulty sleeping  HEME/LYMPH: No easy bruising, or bleeding gums  ALLERY AND IMMUNOLOGIC: No hives or eczema    Allergies    No Known Drug Allergies  shellfish (Unknown)    Intolerances        Social History:     FAMILY HISTORY:      MEDICATIONS  (STANDING):  calcium carbonate 1250 mG  + Vitamin D (OsCal 500 + D) 1 Tablet(s) Oral three times a day  enoxaparin Injectable 40 milliGRAM(s) SubCutaneous <User Schedule>  folic acid 1 milliGRAM(s) Oral daily  gabapentin 400 milliGRAM(s) Oral three times a day  multivitamin 1 Tablet(s) Oral daily  polyethylene glycol 3350 17 Gram(s) Oral daily  senna 2 Tablet(s) Oral at bedtime    MEDICATIONS  (PRN):  acetaminophen   Tablet .. 650 milliGRAM(s) Oral every 6 hours PRN Mild Pain (1 - 3)  diazepam    Tablet 5 milliGRAM(s) Oral every 8 hours PRN Muscle spasms  traMADol 50 milliGRAM(s) Oral every 4 hours PRN Severe Pain (7 - 10)  traMADol 25 milliGRAM(s) Oral every 4 hours PRN Moderate Pain (4 - 6)      Vital Signs Last 24 Hrs  T(C): 37.3 (07 Nov 2019 16:10), Max: 37.3 (07 Nov 2019 16:10)  T(F): 99.2 (07 Nov 2019 16:10), Max: 99.2 (07 Nov 2019 16:10)  HR: 104 (07 Nov 2019 16:10) (68 - 114)  BP: 127/58 (07 Nov 2019 16:10) (117/62 - 155/70)  BP(mean): 91 (07 Nov 2019 04:00) (85 - 113)  RR: 16 (07 Nov 2019 16:10) (16 - 18)  SpO2: 99% (07 Nov 2019 16:10) (95% - 100%)  CAPILLARY BLOOD GLUCOSE        I&O's Summary    06 Nov 2019 07:01  -  07 Nov 2019 07:00  --------------------------------------------------------  IN: 1625 mL / OUT: 1420 mL / NET: 205 mL    07 Nov 2019 07:01  -  07 Nov 2019 16:39  --------------------------------------------------------  IN: 740 mL / OUT: 680 mL / NET: 60 mL        PHYSICAL EXAM:  GENERAL: NAD, well-developed  HEAD:  Atraumatic, Normocephalic  EYES: EOMI, PERRLA, conjunctiva and sclera clear  NECK: Supple, No JVD  CHEST/LUNG: Clear to auscultation bilaterally; No wheeze  HEART: Regular rate and rhythm; No murmurs, rubs, or gallops  ABDOMEN: Soft, Nontender, Nondistended; Bowel sounds present  EXTREMITIES:  2+ Peripheral Pulses, No clubbing, cyanosis, or edema  PSYCH: AAOx3  NEUROLOGY: non-focal  SKIN: No rashes or lesions    LABS:                        8.7    10.56 )-----------( 243      ( 07 Nov 2019 04:02 )             26.3     11-07    140  |  105  |  13  ----------------------------<  158<H>  4.3   |  21<L>  |  0.61    Ca    8.8      07 Nov 2019 04:02                RADIOLOGY & ADDITIONAL TESTS:    Imaging Personally Reviewed:    Consultant(s) Notes Reviewed:      Care Discussed with Consultants/Other Providers: Varun Tyson  Pager [phone]  Office [phone]    CC: S/P L4-5 PLIF  HPI:  70 yo female h/o spondylosis S/P L4-5 PLIF on 19.  cc. Pt reports that Tramadol is not controlling surgical site pain- pt requesting trial of Oxycodone 2.5 mg understanding that 2.5 will likely be too low. Pt denies fatigue/WILLARD/light-headedness/CP/f/c/r. Constipated. No N/V/abd pain      PAST MEDICAL & SURGICAL HISTORY:  Heart murmur since young age  Lumbar spondylosis  S/P cataract extraction  H/O shoulder surgery: surgical repair for muscle tear, right  shoulder  H/O tubal ligation  H/O total knee replacement: bilateral      Review of Systems:   CONSTITUTIONAL: No fever, weight loss, or fatigue  EYES: No eye pain, visual disturbances, or discharge  ENMT:  No difficulty hearing, tinnitus, vertigo; No sinus or throat pain  NECK: No pain or stiffness  BREASTS: No pain, masses, or nipple discharge  RESPIRATORY: No cough, wheezing, chills or hemoptysis; No shortness of breath  CARDIOVASCULAR: No chest pain, palpitations, dizziness, or leg swelling  GASTROINTESTINAL: No abdominal or epigastric pain. No nausea, vomiting, or hematemesis; No diarrhea or constipation. No melena or hematochezia.  GENITOURINARY: No dysuria, frequency, hematuria, or incontinence  NEUROLOGICAL: No headaches, memory loss, loss of strength, numbness, or tremors  SKIN: No itching, burning, rashes, or lesions   LYMPH NODES: No enlarged glands  ENDOCRINE: No heat or cold intolerance; No hair loss  MUSCULOSKELETAL: per HPI  PSYCHIATRIC: No depression, anxiety, mood swings, or difficulty sleeping  HEME/LYMPH: No easy bruising, or bleeding gums  ALLERY AND IMMUNOLOGIC: No hives or eczema    Allergies    No Known Drug Allergies  shellfish (Unknown)    Social History:   No smoking, etoh, illegal drugs    FAMILY HISTORY:  Mother  of liver ca 54  Father  of stroke 62      MEDICATIONS  (STANDING):  calcium carbonate 1250 mG  + Vitamin D (OsCal 500 + D) 1 Tablet(s) Oral three times a day  enoxaparin Injectable 40 milliGRAM(s) SubCutaneous <User Schedule>  folic acid 1 milliGRAM(s) Oral daily  gabapentin 400 milliGRAM(s) Oral three times a day  multivitamin 1 Tablet(s) Oral daily  polyethylene glycol 3350 17 Gram(s) Oral daily  senna 2 Tablet(s) Oral at bedtime    MEDICATIONS  (PRN):  acetaminophen   Tablet .. 650 milliGRAM(s) Oral every 6 hours PRN Mild Pain (1 - 3)  diazepam    Tablet 5 milliGRAM(s) Oral every 8 hours PRN Muscle spasms  traMADol 50 milliGRAM(s) Oral every 4 hours PRN Severe Pain (7 - 10)  traMADol 25 milliGRAM(s) Oral every 4 hours PRN Moderate Pain (4 - 6)      Vital Signs Last 24 Hrs  T(C): 37.3 (2019 16:10), Max: 37.3 (2019 16:10)  T(F): 99.2 (2019 16:10), Max: 99.2 (2019 16:10)  HR: 104 (2019 16:10) (68 - 114)  BP: 127/58 (2019 16:10) (117/62 - 155/70)  BP(mean): 91 (2019 04:00) (85 - 113)  RR: 16 (2019 16:10) (16 - 18)  SpO2: 99% (2019 16:10) (95% - 100%)  CAPILLARY BLOOD GLUCOSE        I&O's Summary    2019 07:  -  2019 07:00  --------------------------------------------------------  IN: 1625 mL / OUT: 1420 mL / NET: 205 mL    2019 07:01  -  2019 16:39  --------------------------------------------------------  IN: 740 mL / OUT: 680 mL / NET: 60 mL        PHYSICAL EXAM:  GENERAL: NAD, well-developed  HEAD:  Atraumatic, Normocephalic  EYES: EOMI, PERRLA, conjunctiva and sclera clear  NECK: Supple, No JVD  CHEST/LUNG: Clear to auscultation bilaterally; No wheeze  HEART: Regular rate and rhythm; mild AD  ABDOMEN: Soft, Nontender, Nondistended; Bowel sounds present  EXTREMITIES:  2+ Peripheral Pulses, No clubbing, cyanosis, or edema  PSYCH: AAOx3  NEUROLOGY: non-focal  SKIN: No rashes     LABS:                        8.7    10.56 )-----------( 243      ( 2019 04:02 )             26.3     11-07    140  |  105  |  13  ----------------------------<  158<H>  4.3   |  21<L>  |  0.61    Ca    8.8      2019 04:02                RADIOLOGY & ADDITIONAL TESTS:    Imaging Personally Reviewed:    Consultant(s) Notes Reviewed:      Care Discussed with Consultants/Other Providers: neurosurg regard pain mgmt Varun Tyson  Pager [phone]  Office [phone]    CC: S/P L4-5 PLIF  HPI:  70 yo female h/o spondylosis S/P L4-5 PLIF on 19.  cc. Pt reports that Tramadol is not controlling surgical site pain- pt requesting trial of Oxycodone 2.5 mg understanding that 2.5 will likely be too low. Pt denies fatigue/WILLARD/light-headedness/CP/f/c/r. Constipated. No N/V/abd pain      PAST MEDICAL & SURGICAL HISTORY:  Heart murmur since young age  Lumbar spondylosis  S/P cataract extraction  H/O shoulder surgery: surgical repair for muscle tear, right  shoulder  H/O tubal ligation  H/O total knee replacement: bilateral      Review of Systems:   CONSTITUTIONAL: No fever, weight loss, or fatigue  EYES: No eye pain, visual disturbances, or discharge  ENMT:  No difficulty hearing, tinnitus, vertigo; No sinus or throat pain  NECK: No pain or stiffness  BREASTS: No pain, masses, or nipple discharge  RESPIRATORY: No cough, wheezing, chills or hemoptysis; No shortness of breath  CARDIOVASCULAR: No chest pain, palpitations, dizziness, or leg swelling  GASTROINTESTINAL: No abdominal or epigastric pain. No nausea, vomiting, or hematemesis; No diarrhea or constipation. No melena or hematochezia.  GENITOURINARY: No dysuria, frequency, hematuria, or incontinence  NEUROLOGICAL: No headaches, memory loss, loss of strength, numbness, or tremors  SKIN: No itching, burning, rashes, or lesions   LYMPH NODES: No enlarged glands  ENDOCRINE: No heat or cold intolerance; No hair loss  MUSCULOSKELETAL: per HPI  PSYCHIATRIC: No depression, anxiety, mood swings, or difficulty sleeping  HEME/LYMPH: No easy bruising, or bleeding gums  ALLERY AND IMMUNOLOGIC: No hives or eczema    Allergies    Codeine- itching  shellfish (Unknown)    Social History:   No smoking, etoh, illegal drugs    FAMILY HISTORY:  Mother  of liver ca 54  Father  of stroke 62      MEDICATIONS  (STANDING):  calcium carbonate 1250 mG  + Vitamin D (OsCal 500 + D) 1 Tablet(s) Oral three times a day  enoxaparin Injectable 40 milliGRAM(s) SubCutaneous <User Schedule>  folic acid 1 milliGRAM(s) Oral daily  gabapentin 400 milliGRAM(s) Oral three times a day  multivitamin 1 Tablet(s) Oral daily  polyethylene glycol 3350 17 Gram(s) Oral daily  senna 2 Tablet(s) Oral at bedtime    MEDICATIONS  (PRN):  acetaminophen   Tablet .. 650 milliGRAM(s) Oral every 6 hours PRN Mild Pain (1 - 3)  diazepam    Tablet 5 milliGRAM(s) Oral every 8 hours PRN Muscle spasms  traMADol 50 milliGRAM(s) Oral every 4 hours PRN Severe Pain (7 - 10)  traMADol 25 milliGRAM(s) Oral every 4 hours PRN Moderate Pain (4 - 6)      Vital Signs Last 24 Hrs  T(C): 37.3 (2019 16:10), Max: 37.3 (2019 16:10)  T(F): 99.2 (2019 16:10), Max: 99.2 (2019 16:10)  HR: 104 (2019 16:10) (68 - 114)  BP: 127/58 (2019 16:10) (117/62 - 155/70)  BP(mean): 91 (2019 04:00) (85 - 113)  RR: 16 (2019 16:10) (16 - 18)  SpO2: 99% (2019 16:10) (95% - 100%)  CAPILLARY BLOOD GLUCOSE        I&O's Summary    2019 07:  -  2019 07:00  --------------------------------------------------------  IN: 1625 mL / OUT: 1420 mL / NET: 205 mL    2019 07:  -  2019 16:39  --------------------------------------------------------  IN: 740 mL / OUT: 680 mL / NET: 60 mL        PHYSICAL EXAM:  GENERAL: NAD, well-developed  HEAD:  Atraumatic, Normocephalic  EYES: EOMI, PERRLA, conjunctiva and sclera clear  NECK: Supple, No JVD  CHEST/LUNG: Clear to auscultation bilaterally; No wheeze  HEART: Regular rate and rhythm; mild AD  ABDOMEN: Soft, Nontender, Nondistended; Bowel sounds present  EXTREMITIES:  2+ Peripheral Pulses, No clubbing, cyanosis, or edema  PSYCH: AAOx3  NEUROLOGY: non-focal  SKIN: No rashes     LABS:                        8.7    10.56 )-----------( 243      ( 2019 04:02 )             26.3     11-07    140  |  105  |  13  ----------------------------<  158<H>  4.3   |  21<L>  |  0.61    Ca    8.8      2019 04:02                RADIOLOGY & ADDITIONAL TESTS:    Imaging Personally Reviewed:    Consultant(s) Notes Reviewed:      Care Discussed with Consultants/Other Providers: neurosurg regard pain mgmt Varun Tyson  Pager [phone]  Office [phone]    CC: S/P L4-5 PLIF  HPI:  68 yo female h/o spondylosis S/P L4-5 PLIF on 19.  cc. Pt reports that Tramadol is not controlling surgical site pain- pt requesting trial of Oxycodone 2.5 mg understanding that 2.5 will likely be too low. Pt denies fatigue/WILLARD/light-headedness/CP/f/c/r. Constipated. No N/V/abd pain      PAST MEDICAL & SURGICAL HISTORY:  Heart murmur since young age  Lumbar spondylosis  S/P cataract extraction  H/O shoulder surgery: surgical repair for muscle tear, right  shoulder  H/O tubal ligation  H/O total knee replacement: bilateral      Review of Systems:   CONSTITUTIONAL: No fever, weight loss, or fatigue  EYES: No eye pain, visual disturbances, or discharge  ENMT:  No difficulty hearing, tinnitus, vertigo; No sinus or throat pain  NECK: No pain or stiffness  BREASTS: No pain, masses, or nipple discharge  RESPIRATORY: No cough, wheezing, chills or hemoptysis; No shortness of breath  CARDIOVASCULAR: No chest pain, palpitations, dizziness, or leg swelling  GASTROINTESTINAL: No abdominal or epigastric pain. No nausea, vomiting, or hematemesis; No diarrhea or constipation. No melena or hematochezia.  GENITOURINARY: No dysuria, frequency, hematuria, or incontinence  NEUROLOGICAL: No headaches, memory loss, loss of strength, numbness, or tremors  SKIN: No itching, burning, rashes, or lesions   LYMPH NODES: No enlarged glands  ENDOCRINE: No heat or cold intolerance; No hair loss  MUSCULOSKELETAL: per HPI  PSYCHIATRIC: No depression, anxiety, mood swings, or difficulty sleeping  HEME/LYMPH: No easy bruising, or bleeding gums  ALLERY AND IMMUNOLOGIC: No hives or eczema    Allergies    Codeine- itching (no hives)  shellfish (Unknown)    Social History:   No smoking, etoh, illegal drugs    FAMILY HISTORY:  Mother  of liver ca 54  Father  of stroke 62      MEDICATIONS  (STANDING):  calcium carbonate 1250 mG  + Vitamin D (OsCal 500 + D) 1 Tablet(s) Oral three times a day  enoxaparin Injectable 40 milliGRAM(s) SubCutaneous <User Schedule>  folic acid 1 milliGRAM(s) Oral daily  gabapentin 400 milliGRAM(s) Oral three times a day  multivitamin 1 Tablet(s) Oral daily  polyethylene glycol 3350 17 Gram(s) Oral daily  senna 2 Tablet(s) Oral at bedtime    MEDICATIONS  (PRN):  acetaminophen   Tablet .. 650 milliGRAM(s) Oral every 6 hours PRN Mild Pain (1 - 3)  diazepam    Tablet 5 milliGRAM(s) Oral every 8 hours PRN Muscle spasms  traMADol 50 milliGRAM(s) Oral every 4 hours PRN Severe Pain (7 - 10)  traMADol 25 milliGRAM(s) Oral every 4 hours PRN Moderate Pain (4 - 6)      Vital Signs Last 24 Hrs  T(C): 37.3 (2019 16:10), Max: 37.3 (2019 16:10)  T(F): 99.2 (2019 16:10), Max: 99.2 (2019 16:10)  HR: 104 (2019 16:10) (68 - 114)  BP: 127/58 (2019 16:10) (117/62 - 155/70)  BP(mean): 91 (2019 04:00) (85 - 113)  RR: 16 (2019 16:10) (16 - 18)  SpO2: 99% (2019 16:10) (95% - 100%)  CAPILLARY BLOOD GLUCOSE        I&O's Summary    2019 07:  -  2019 07:00  --------------------------------------------------------  IN: 1625 mL / OUT: 1420 mL / NET: 205 mL    2019 07:  -  2019 16:39  --------------------------------------------------------  IN: 740 mL / OUT: 680 mL / NET: 60 mL        PHYSICAL EXAM:  GENERAL: NAD, well-developed  HEAD:  Atraumatic, Normocephalic  EYES: EOMI, PERRLA, conjunctiva and sclera clear  NECK: Supple, No JVD  CHEST/LUNG: Clear to auscultation bilaterally; No wheeze  HEART: Regular rate and rhythm; mild AD  ABDOMEN: Soft, Nontender, Nondistended; Bowel sounds present  EXTREMITIES:  2+ Peripheral Pulses, No clubbing, cyanosis, or edema  PSYCH: AAOx3  NEUROLOGY: non-focal  SKIN: No rashes     LABS:                        8.7    10.56 )-----------( 243      ( 2019 04:02 )             26.3     11-07    140  |  105  |  13  ----------------------------<  158<H>  4.3   |  21<L>  |  0.61    Ca    8.8      2019 04:02                RADIOLOGY & ADDITIONAL TESTS:    Imaging Personally Reviewed:    Consultant(s) Notes Reviewed:      Care Discussed with Consultants/Other Providers: neurosurg regard pain mgmt

## 2019-11-07 NOTE — CHART NOTE - NSCHARTNOTEFT_GEN_A_CORE
Measure fit and deliver [place] custom fit LSO. Donned for fit orthosis fit well. Reviewed application skin precautions and care with patient and spouse. Written instructions and contact information given.  To notify office with any issues questions or concerns.  Saqib OCHOA  [facility]  [phone]

## 2019-11-07 NOTE — PROGRESS NOTE ADULT - SUBJECTIVE AND OBJECTIVE BOX
SUBJECTIVE: Patient seen and examined at bedside.  In NAD.  States that she has no pain and has not used PCA for several hours.  Wants to ambulate.      CHIEF COMPLAINT: back pain    OVERNIGHT EVENTS: post op was in PACU over night, on PCA    Vital Signs Last 24 Hrs  T(C): 37 (07 Nov 2019 08:47), Max: 37.1 (07 Nov 2019 05:00)  T(F): 98.6 (07 Nov 2019 08:47), Max: 98.8 (07 Nov 2019 05:00)  HR: 88 (07 Nov 2019 08:47) (74 - 114)  BP: 125/56 (07 Nov 2019 08:47) (117/62 - 169/88)  BP(mean): 91 (07 Nov 2019 04:00) (85 - 113)  RR: 16 (07 Nov 2019 08:47) (16 - 18)  SpO2: 98% (07 Nov 2019 08:47) (95% - 100%)    PHYSICAL EXAM:    General: No Acute Distress     Neurological: Alert and oriented to self, place and date.  Speech clear, follow commands.  RENEE.  Motor 5/5 B/L LE.  Sensory grossly intact to light touch.      Pulmonary: Clear to Auscultation, No Rales, No Rhonchi, No Wheezes     Cardiovascular: S1, S2, Regular Rate and Rhythm     Gastrointestinal: Soft, Nontender, Nondistended     Incision: dressing in place, clean and dry.    LABS:                        8.7    10.56 )-----------( 243      ( 07 Nov 2019 04:02 )             26.3    11-07    140  |  105  |  13  ----------------------------<  158<H>  4.3   |  21<L>  |  0.61    Ca    8.8      07 Nov 2019 04:02          11-06 @ 07:01  -  11-07 @ 07:00  --------------------------------------------------------  IN: 1625 mL / OUT: 1420 mL / NET: 205 mL    11-07 @ 07:01  -  11-07 @ 09:36  --------------------------------------------------------  IN: 360 mL / OUT: 0 mL / NET: 360 mL      DRAINS: +HMV - 245 CC    MEDICATIONS:  Antibiotics:    Neuro:  acetaminophen   Tablet .. 650 milliGRAM(s) Oral every 6 hours PRN Mild Pain (1 - 3)  diazepam    Tablet 5 milliGRAM(s) Oral every 8 hours PRN Muscle spasms  gabapentin 400 milliGRAM(s) Oral three times a day  traMADol 50 milliGRAM(s) Oral every 4 hours PRN Severe Pain (7 - 10)  traMADol 25 milliGRAM(s) Oral every 4 hours PRN Moderate Pain (4 - 6)    Cardiac:    Pulm:    GI/:  polyethylene glycol 3350 17 Gram(s) Oral daily  senna 2 Tablet(s) Oral at bedtime    Other:   calcium carbonate 1250 mG  + Vitamin D (OsCal 500 + D) 1 Tablet(s) Oral three times a day  folic acid 1 milliGRAM(s) Oral daily  multivitamin 1 Tablet(s) Oral daily    DIET: [X] Regular [] CCD [] Renal [] Puree [] Dysphagia [] Tube Feeds:     IMAGING: none

## 2019-11-07 NOTE — CONSULT NOTE ADULT - PROBLEM SELECTOR RECOMMENDATION 3
Tramadol switched to Oxycodone per pt request As tramadol not working to control pain and given Codeine allergy will retry Hydromorphone as pt had no reaction using PCA

## 2019-11-07 NOTE — CHART NOTE - NSCHARTNOTEFT_GEN_A_CORE
CAPRINI SCORE [CLOT] Score on Admission for     AGE RELATED RISK FACTORS                                                       MOBILITY RELATED FACTORS  [ ] Age 41-60 years                                            (1 Point)                  [ ] Bed rest                                                        (1 Point)  [X ] Age: 61-74 years                                           (2 Points)                 [ ] Plaster cast                                                   (2 Points)  [ ] Age= 75 years                                              (3 Points)                 [ ] Bed bound for more than 72 hours                 (2 Points)    DISEASE RELATED RISK FACTORS                                               GENDER SPECIFIC FACTORS  [ ] Edema in the lower extremities                       (1 Point)                  [ ] Pregnancy                                                     (1 Point)  [ ] Varicose veins                                               (1 Point)                  [ ] Post-partum < 6 weeks                                   (1 Point)             [ ] BMI > 25 Kg/m2                                            (1 Point)                  [ ] Hormonal therapy  or oral contraception          (1 Point)                 [ ] Sepsis (in the previous month)                        (1 Point)                  [ ] History of pregnancy complications                 (1 point)  [ ] Pneumonia or serious lung disease                                               [ ] Unexplained or recurrent                     (1 Point)           (in the previous month)                               (1 Point)  [ ] Abnormal pulmonary function test                     (1 Point)                 SURGERY RELATED RISK FACTORS (include planned surgeries)  [ ] Acute myocardial infarction                              (1 Point)                 [ ]  Section                                             (1 Point)  [ ] Congestive heart failure (in the previous month)  (1 Point)         [ ] Minor surgery                                                  (1 Point)   [ ] Inflammatory bowel disease                             (1 Point)                 [ ] Arthroscopic surgery                                        (2 Points)  [ ] Central venous access                                      (2 Points)                [X ] General surgery lasting more than 45 minutes   (2 Points)       [ ] Stroke (in the previous month)                          (5 Points)               [ ] Elective arthroplasty                                         (5 Points)            [ ] current or past malignancy                              (2 Points)                                                                                                       HEMATOLOGY RELATED FACTORS                                                 TRAUMA RELATED RISK FACTORS  [ ] Prior episodes of VTE                                     (3 Points)                [ ] Fracture of the hip, pelvis, or leg                       (5 Points)  [ ] Positive family history for VTE                         (3 Points)                 [ ] Acute spinal cord injury (in the previous month)  (5 Points)  [ ] Prothrombin 31320 A                                     (3 Points)                 [ ] Paralysis  (less than 1 month)                             (5 Points)  [ ] Factor V Leiden                                             (3 Points)                  [ ] Multiple Trauma within 1 month                        (5 Points)  [ ] Lupus anticoagulants                                     (3 Points)                                                           [ ] Anticardiolipin antibodies                               (3 Points)                                                       [ ] High homocysteine in the blood                      (3 Points)                                             [ ] Other congenital or acquired thrombophilia      (3 Points)                                                [ ] Heparin induced thrombocytopenia                  (3 Points)                                          Total Score [  3       ]    Risk:  Very low 0   Low 1 to 2   Moderate 3 to 4   High =5       VTE Prophylasix Recommednations:  [ X] mechanical pneumatic compression devices                                      [ ] contraindicated: _____________________  [ X] chemo prophylasix                                                                                   [ ] contraindicated _____________________    **** HIGH LIKELIHOOD DVT PRESENT ON ADMISSION  [ ] (please order LE dopplers within 24 hours of admission)

## 2019-11-07 NOTE — PHYSICAL THERAPY INITIAL EVALUATION ADULT - PERTINENT HX OF CURRENT PROBLEM, REHAB EVAL
68 yo F c/o progressively worsening low back pain with radiculopathy to R hip. pain is constant, 6-10/10, aggravated with movements, "it's pain". MRI revealed severe L45 stenosis with multilevel spondylosis. now presents to PST scheduled for laminectomy and fusion.

## 2019-11-08 ENCOUNTER — TRANSCRIPTION ENCOUNTER (OUTPATIENT)
Age: 69
End: 2019-11-08

## 2019-11-08 LAB
BLD GP AB SCN SERPL QL: NEGATIVE — SIGNIFICANT CHANGE UP
HCT VFR BLD CALC: 21.1 % — LOW (ref 34.5–45)
HGB BLD-MCNC: 6.9 G/DL — CRITICAL LOW (ref 11.5–15.5)
MCHC RBC-ENTMCNC: 30.1 PG — SIGNIFICANT CHANGE UP (ref 27–34)
MCHC RBC-ENTMCNC: 32.7 GM/DL — SIGNIFICANT CHANGE UP (ref 32–36)
MCV RBC AUTO: 92.1 FL — SIGNIFICANT CHANGE UP (ref 80–100)
PLATELET # BLD AUTO: 178 K/UL — SIGNIFICANT CHANGE UP (ref 150–400)
RBC # BLD: 2.29 M/UL — LOW (ref 3.8–5.2)
RBC # FLD: 14.4 % — SIGNIFICANT CHANGE UP (ref 10.3–14.5)
RH IG SCN BLD-IMP: POSITIVE — SIGNIFICANT CHANGE UP
WBC # BLD: 8.13 K/UL — SIGNIFICANT CHANGE UP (ref 3.8–10.5)
WBC # FLD AUTO: 8.13 K/UL — SIGNIFICANT CHANGE UP (ref 3.8–10.5)

## 2019-11-08 PROCEDURE — 99233 SBSQ HOSP IP/OBS HIGH 50: CPT

## 2019-11-08 RX ORDER — METHOCARBAMOL 500 MG/1
500 TABLET, FILM COATED ORAL EVERY 8 HOURS
Refills: 0 | Status: COMPLETED | OUTPATIENT
Start: 2019-11-08 | End: 2019-11-09

## 2019-11-08 RX ORDER — DIAZEPAM 5 MG
2 TABLET ORAL EVERY 6 HOURS
Refills: 0 | Status: DISCONTINUED | OUTPATIENT
Start: 2019-11-08 | End: 2019-11-10

## 2019-11-08 RX ORDER — LACTULOSE 10 G/15ML
20 SOLUTION ORAL
Refills: 0 | Status: DISCONTINUED | OUTPATIENT
Start: 2019-11-08 | End: 2019-11-10

## 2019-11-08 RX ADMIN — SENNA PLUS 2 TABLET(S): 8.6 TABLET ORAL at 22:36

## 2019-11-08 RX ADMIN — GABAPENTIN 400 MILLIGRAM(S): 400 CAPSULE ORAL at 05:12

## 2019-11-08 RX ADMIN — Medication 1 TABLET(S): at 16:25

## 2019-11-08 RX ADMIN — Medication 650 MILLIGRAM(S): at 08:54

## 2019-11-08 RX ADMIN — Medication 2 MILLIGRAM(S): at 23:57

## 2019-11-08 RX ADMIN — METHOCARBAMOL 500 MILLIGRAM(S): 500 TABLET, FILM COATED ORAL at 16:24

## 2019-11-08 RX ADMIN — TRAMADOL HYDROCHLORIDE 50 MILLIGRAM(S): 50 TABLET ORAL at 03:33

## 2019-11-08 RX ADMIN — Medication 1 MILLIGRAM(S): at 11:21

## 2019-11-08 RX ADMIN — Medication 1 TABLET(S): at 22:36

## 2019-11-08 RX ADMIN — Medication 650 MILLIGRAM(S): at 09:30

## 2019-11-08 RX ADMIN — TRAMADOL HYDROCHLORIDE 50 MILLIGRAM(S): 50 TABLET ORAL at 04:03

## 2019-11-08 RX ADMIN — Medication 2 MILLIGRAM(S): at 18:43

## 2019-11-08 RX ADMIN — GABAPENTIN 400 MILLIGRAM(S): 400 CAPSULE ORAL at 16:25

## 2019-11-08 RX ADMIN — LACTULOSE 20 GRAM(S): 10 SOLUTION ORAL at 18:41

## 2019-11-08 RX ADMIN — METHOCARBAMOL 500 MILLIGRAM(S): 500 TABLET, FILM COATED ORAL at 22:35

## 2019-11-08 RX ADMIN — POLYETHYLENE GLYCOL 3350 17 GRAM(S): 17 POWDER, FOR SOLUTION ORAL at 11:22

## 2019-11-08 RX ADMIN — TRAMADOL HYDROCHLORIDE 50 MILLIGRAM(S): 50 TABLET ORAL at 23:00

## 2019-11-08 RX ADMIN — Medication 1 TABLET(S): at 11:21

## 2019-11-08 RX ADMIN — TRAMADOL HYDROCHLORIDE 50 MILLIGRAM(S): 50 TABLET ORAL at 22:36

## 2019-11-08 RX ADMIN — TRAMADOL HYDROCHLORIDE 50 MILLIGRAM(S): 50 TABLET ORAL at 16:24

## 2019-11-08 RX ADMIN — Medication 2 MILLIGRAM(S): at 11:21

## 2019-11-08 RX ADMIN — GABAPENTIN 400 MILLIGRAM(S): 400 CAPSULE ORAL at 22:35

## 2019-11-08 RX ADMIN — TRAMADOL HYDROCHLORIDE 50 MILLIGRAM(S): 50 TABLET ORAL at 17:00

## 2019-11-08 RX ADMIN — Medication 1 TABLET(S): at 05:12

## 2019-11-08 NOTE — PROVIDER CONTACT NOTE (OTHER) - SITUATION
Pt. HR fluctuating between 102 and 106. Blood pressure WNL, Pt. H/H low, receiving 2 units PRBC's at this time.

## 2019-11-08 NOTE — DISCHARGE NOTE NURSING/CASE MANAGEMENT/SOCIAL WORK - NSCORESITESY/N_GEN_A_CORE_RD
Problem: Safety  Goal: Will remain free from injury  Outcome: PROGRESSING AS EXPECTED  Patient free from accidental injury at this time. Safety precautions in place. Hourly rounding in place.      Problem: Pain Management  Goal: Pain level will decrease to patient's comfort goal  Outcome: PROGRESSING SLOWER THAN EXPECTED  Patient calls frequently for pain medication. Patient medicated per MAR. Hourly rounding in place.    No

## 2019-11-08 NOTE — DISCHARGE NOTE NURSING/CASE MANAGEMENT/SOCIAL WORK - PATIENT PORTAL LINK FT
You can access the FollowMyHealth Patient Portal offered by [facility] by registering at the following website: [email or web address]. By joining [email or web address]’s FollowMyHealth portal, you will also be able to view your health information using other applications (apps) compatible with our system.

## 2019-11-08 NOTE — PROGRESS NOTE ADULT - SUBJECTIVE AND OBJECTIVE BOX
Varun Tyson   Pager [phone]  Office [phone]      CC: Patient is a 69y old  Female who presents with a chief complaint of Patient was admitted with worsening low back pain and radiculopathy (08 Nov 2019 11:07)      SUBJECTIVE / OVERNIGHT EVENTS:    MEDICATIONS  (STANDING):  calcium carbonate 1250 mG  + Vitamin D (OsCal 500 + D) 1 Tablet(s) Oral three times a day  diazepam    Tablet 2 milliGRAM(s) Oral every 6 hours  folic acid 1 milliGRAM(s) Oral daily  gabapentin 400 milliGRAM(s) Oral three times a day  methocarbamol 500 milliGRAM(s) Oral every 8 hours  multivitamin 1 Tablet(s) Oral daily  polyethylene glycol 3350 17 Gram(s) Oral daily  senna 2 Tablet(s) Oral at bedtime    MEDICATIONS  (PRN):  acetaminophen   Tablet .. 650 milliGRAM(s) Oral every 6 hours PRN Mild Pain (1 - 3)  lactulose Syrup 20 Gram(s) Oral two times a day PRN constipation  traMADol 25 milliGRAM(s) Oral every 4 hours PRN Moderate Pain (4 - 6)  traMADol 50 milliGRAM(s) Oral every 4 hours PRN Severe Pain (7 - 10)      Vital Signs Last 24 Hrs  T(C): 36.9 (08 Nov 2019 13:14), Max: 37.7 (08 Nov 2019 05:12)  T(F): 98.5 (08 Nov 2019 13:14), Max: 99.9 (08 Nov 2019 05:12)  HR: 106 (08 Nov 2019 13:14) (93 - 106)  BP: 116/70 (08 Nov 2019 13:14) (111/63 - 133/58)  BP(mean): --  RR: 16 (08 Nov 2019 13:14) (16 - 18)  SpO2: 98% (08 Nov 2019 13:14) (93% - 100%)  CAPILLARY BLOOD GLUCOSE        I&O's Summary    07 Nov 2019 07:01  -  08 Nov 2019 07:00  --------------------------------------------------------  IN: 1350 mL / OUT: 1010 mL / NET: 340 mL    08 Nov 2019 07:01  -  08 Nov 2019 14:39  --------------------------------------------------------  IN: 720 mL / OUT: 32 mL / NET: 688 mL      tele:    PHYSICAL EXAM:    GENERAL: NAD   HEENT: EOMI, PERRL  PULM: Clear to auscultation bilaterally  CV: Regular rate and rhythm; nl S1, S2; No murmurs, rubs, or gallops  ABDOMEN: Soft, Nontender, Nondistended; Bowel sounds present  EXTREMITIES/MSK:  No edema, calf tenderness   PSYCH: AAOx3  NEUROLOGY: non-focal          LABS:                        6.9    8.13  )-----------( 178      ( 08 Nov 2019 08:11 )             21.1     11-07    140  |  105  |  13  ----------------------------<  158<H>  4.3   |  21<L>  |  0.61    Ca    8.8      07 Nov 2019 04:02                  RADIOLOGY & ADDITIONAL TESTS:    Imaging Personally Reviewed:    Consultant(s) Notes Reviewed:      Care Discussed with Consultants/Other Providers: Varun Tyson   Pager [phone]  Office [phone]      CC: Patient is a 69y old  Female who presents with a chief complaint of Patient was admitted with worsening low back pain and radiculopathy (08 Nov 2019 11:07)      SUBJECTIVE / OVERNIGHT EVENTS: Surgical site pain goes from 7/10 to 3/10 c Tramadol. No BM. Fatigued this am c ambulation found to be anemic requiring PRBC. No CP/SOB, light-headedness.    MEDICATIONS  (STANDING):  calcium carbonate 1250 mG  + Vitamin D (OsCal 500 + D) 1 Tablet(s) Oral three times a day  diazepam    Tablet 2 milliGRAM(s) Oral every 6 hours  folic acid 1 milliGRAM(s) Oral daily  gabapentin 400 milliGRAM(s) Oral three times a day  methocarbamol 500 milliGRAM(s) Oral every 8 hours  multivitamin 1 Tablet(s) Oral daily  polyethylene glycol 3350 17 Gram(s) Oral daily  senna 2 Tablet(s) Oral at bedtime    MEDICATIONS  (PRN):  acetaminophen   Tablet .. 650 milliGRAM(s) Oral every 6 hours PRN Mild Pain (1 - 3)  lactulose Syrup 20 Gram(s) Oral two times a day PRN constipation  traMADol 25 milliGRAM(s) Oral every 4 hours PRN Moderate Pain (4 - 6)  traMADol 50 milliGRAM(s) Oral every 4 hours PRN Severe Pain (7 - 10)      Vital Signs Last 24 Hrs  T(C): 36.9 (08 Nov 2019 13:14), Max: 37.7 (08 Nov 2019 05:12)  T(F): 98.5 (08 Nov 2019 13:14), Max: 99.9 (08 Nov 2019 05:12)  HR: 106 (08 Nov 2019 13:14) (93 - 106)  BP: 116/70 (08 Nov 2019 13:14) (111/63 - 133/58)  BP(mean): --  RR: 16 (08 Nov 2019 13:14) (16 - 18)  SpO2: 98% (08 Nov 2019 13:14) (93% - 100%)  CAPILLARY BLOOD GLUCOSE        I&O's Summary    07 Nov 2019 07:01  -  08 Nov 2019 07:00  --------------------------------------------------------  IN: 1350 mL / OUT: 1010 mL / NET: 340 mL    08 Nov 2019 07:01  -  08 Nov 2019 14:39  --------------------------------------------------------  IN: 720 mL / OUT: 32 mL / NET: 688 mL          PHYSICAL EXAM:    GENERAL: NAD   HEENT: EOMI, PERRL  PULM: Clear to auscultation bilaterally  CV: Regular rate and rhythm; nl S1, S2; No murmurs, rubs, or gallops  ABDOMEN: Soft, Nontender, Nondistended; Bowel sounds present  EXTREMITIES/MSK:  No edema, calf tenderness   PSYCH: AAOx3  NEUROLOGY: non-focal          LABS:                        6.9    8.13  )-----------( 178      ( 08 Nov 2019 08:11 )             21.1     11-07    140  |  105  |  13  ----------------------------<  158<H>  4.3   |  21<L>  |  0.61    Ca    8.8      07 Nov 2019 04:02                  RADIOLOGY & ADDITIONAL TESTS:    Imaging Personally Reviewed:    Consultant(s) Notes Reviewed:      Care Discussed with Consultants/Other Providers: neurosurg regarding anemia/pain

## 2019-11-08 NOTE — PROGRESS NOTE ADULT - SUBJECTIVE AND OBJECTIVE BOX
SUBJECTIVE:   Patient was seen and evaluated at bedside. Patient is resting in bed and is in no new acute distress.   OVERNIGHT EVENTS:   none   Vital Signs Last 24 Hrs  T(C): 37.3 (08 Nov 2019 08:13), Max: 37.7 (08 Nov 2019 05:12)  T(F): 99.2 (08 Nov 2019 08:13), Max: 99.9 (08 Nov 2019 05:12)  HR: 98 (08 Nov 2019 08:13) (68 - 104)  BP: 114/68 (08 Nov 2019 08:13) (111/63 - 133/58)  BP(mean): --  RR: 16 (08 Nov 2019 08:13) (16 - 18)  SpO2: 97% (08 Nov 2019 08:13) (93% - 100%)    PHYSICAL EXAM:    General: No Acute Distress     Neurological: Awake, alert oriented to person, place and time, Following Commands, PERRL, EOMI, Face Symmetrical, Speech Fluent, Moving all extremities, Muscle Strength normal in all four extremities, No Drift, Sensation to Light Touch Intact    Pulmonary: Clear to Auscultation, No Rales, No Rhonchi, No Wheezes     Cardiovascular: S1, S2, Regular Rate and Rhythm     Gastrointestinal: Soft, Nontender, Nondistended     Incision: clean and dry     LABS:                        6.9    8.13  )-----------( 178      ( 08 Nov 2019 08:11 )             21.1    11-07    140  |  105  |  13  ----------------------------<  158<H>  4.3   |  21<L>  |  0.61    Ca    8.8      07 Nov 2019 04:02          11-07 @ 07:01  -  11-08 @ 07:00  --------------------------------------------------------  IN: 1350 mL / OUT: 1010 mL / NET: 340 mL    11-08 @ 07:01  -  11-08 @ 11:08  --------------------------------------------------------  IN: 360 mL / OUT: 0 mL / NET: 360 mL      DRAINS:   hmv 260 cc   MEDICATIONS:  Antibiotics:    Neuro:  acetaminophen   Tablet .. 650 milliGRAM(s) Oral every 6 hours PRN Mild Pain (1 - 3)  diazepam    Tablet 2 milliGRAM(s) Oral every 6 hours  gabapentin 400 milliGRAM(s) Oral three times a day  methocarbamol 500 milliGRAM(s) Oral every 8 hours  traMADol 25 milliGRAM(s) Oral every 4 hours PRN Moderate Pain (4 - 6)  traMADol 50 milliGRAM(s) Oral every 4 hours PRN Severe Pain (7 - 10)    Cardiac:    Pulm:    GI/:  lactulose Syrup 20 Gram(s) Oral two times a day PRN constipation  polyethylene glycol 3350 17 Gram(s) Oral daily  senna 2 Tablet(s) Oral at bedtime    Other:   calcium carbonate 1250 mG  + Vitamin D (OsCal 500 + D) 1 Tablet(s) Oral three times a day  folic acid 1 milliGRAM(s) Oral daily  multivitamin 1 Tablet(s) Oral daily    DIET: [] Regular [] CCD [] Renal [] Puree [] Dysphagia [] Tube Feeds: regular     IMAGING:   ct ernestina kirby

## 2019-11-08 NOTE — PROGRESS NOTE ADULT - PROBLEM SELECTOR PLAN 2
getting 1 unit PRBC for symptomatic anemia. putting out less from drain today getting 1 unit PRBC for symptomatic anemia. putting out less from drain today. monitor

## 2019-11-09 LAB
ANION GAP SERPL CALC-SCNC: 10 MMOL/L — SIGNIFICANT CHANGE UP (ref 5–17)
BUN SERPL-MCNC: 7 MG/DL — SIGNIFICANT CHANGE UP (ref 7–23)
CALCIUM SERPL-MCNC: 9.5 MG/DL — SIGNIFICANT CHANGE UP (ref 8.4–10.5)
CHLORIDE SERPL-SCNC: 101 MMOL/L — SIGNIFICANT CHANGE UP (ref 96–108)
CO2 SERPL-SCNC: 25 MMOL/L — SIGNIFICANT CHANGE UP (ref 22–31)
CREAT SERPL-MCNC: 0.56 MG/DL — SIGNIFICANT CHANGE UP (ref 0.5–1.3)
GLUCOSE SERPL-MCNC: 116 MG/DL — HIGH (ref 70–99)
HCT VFR BLD CALC: 26.6 % — LOW (ref 34.5–45)
HCT VFR BLD CALC: 27.2 % — LOW (ref 34.5–45)
HGB BLD-MCNC: 8.9 G/DL — LOW (ref 11.5–15.5)
HGB BLD-MCNC: 9.2 G/DL — LOW (ref 11.5–15.5)
MCHC RBC-ENTMCNC: 29.2 PG — SIGNIFICANT CHANGE UP (ref 27–34)
MCHC RBC-ENTMCNC: 29.7 PG — SIGNIFICANT CHANGE UP (ref 27–34)
MCHC RBC-ENTMCNC: 33.5 GM/DL — SIGNIFICANT CHANGE UP (ref 32–36)
MCHC RBC-ENTMCNC: 33.8 GM/DL — SIGNIFICANT CHANGE UP (ref 32–36)
MCV RBC AUTO: 87.2 FL — SIGNIFICANT CHANGE UP (ref 80–100)
MCV RBC AUTO: 87.7 FL — SIGNIFICANT CHANGE UP (ref 80–100)
NRBC # BLD: 0 /100 WBCS — SIGNIFICANT CHANGE UP (ref 0–0)
NRBC # BLD: 0 /100 WBCS — SIGNIFICANT CHANGE UP (ref 0–0)
PLATELET # BLD AUTO: 177 K/UL — SIGNIFICANT CHANGE UP (ref 150–400)
PLATELET # BLD AUTO: 178 K/UL — SIGNIFICANT CHANGE UP (ref 150–400)
POTASSIUM SERPL-MCNC: 3.5 MMOL/L — SIGNIFICANT CHANGE UP (ref 3.5–5.3)
POTASSIUM SERPL-SCNC: 3.5 MMOL/L — SIGNIFICANT CHANGE UP (ref 3.5–5.3)
RBC # BLD: 3.05 M/UL — LOW (ref 3.8–5.2)
RBC # BLD: 3.1 M/UL — LOW (ref 3.8–5.2)
RBC # FLD: 14.8 % — HIGH (ref 10.3–14.5)
RBC # FLD: 15 % — HIGH (ref 10.3–14.5)
SODIUM SERPL-SCNC: 136 MMOL/L — SIGNIFICANT CHANGE UP (ref 135–145)
WBC # BLD: 9.26 K/UL — SIGNIFICANT CHANGE UP (ref 3.8–10.5)
WBC # BLD: 9.37 K/UL — SIGNIFICANT CHANGE UP (ref 3.8–10.5)
WBC # FLD AUTO: 9.26 K/UL — SIGNIFICANT CHANGE UP (ref 3.8–10.5)
WBC # FLD AUTO: 9.37 K/UL — SIGNIFICANT CHANGE UP (ref 3.8–10.5)

## 2019-11-09 PROCEDURE — 99233 SBSQ HOSP IP/OBS HIGH 50: CPT

## 2019-11-09 RX ORDER — POLYETHYLENE GLYCOL 3350 17 G/17G
17 POWDER, FOR SOLUTION ORAL
Refills: 0 | Status: DISCONTINUED | OUTPATIENT
Start: 2019-11-09 | End: 2019-11-09

## 2019-11-09 RX ORDER — POTASSIUM CHLORIDE 20 MEQ
20 PACKET (EA) ORAL
Refills: 0 | Status: COMPLETED | OUTPATIENT
Start: 2019-11-09 | End: 2019-11-09

## 2019-11-09 RX ORDER — HYDROMORPHONE HYDROCHLORIDE 2 MG/ML
0.5 INJECTION INTRAMUSCULAR; INTRAVENOUS; SUBCUTANEOUS ONCE
Refills: 0 | Status: DISCONTINUED | OUTPATIENT
Start: 2019-11-09 | End: 2019-11-09

## 2019-11-09 RX ORDER — POLYETHYLENE GLYCOL 3350 17 G/17G
17 POWDER, FOR SOLUTION ORAL DAILY
Refills: 0 | Status: DISCONTINUED | OUTPATIENT
Start: 2019-11-09 | End: 2019-11-10

## 2019-11-09 RX ADMIN — Medication 20 MILLIEQUIVALENT(S): at 12:03

## 2019-11-09 RX ADMIN — GABAPENTIN 400 MILLIGRAM(S): 400 CAPSULE ORAL at 05:20

## 2019-11-09 RX ADMIN — GABAPENTIN 400 MILLIGRAM(S): 400 CAPSULE ORAL at 13:21

## 2019-11-09 RX ADMIN — Medication 650 MILLIGRAM(S): at 18:02

## 2019-11-09 RX ADMIN — TRAMADOL HYDROCHLORIDE 50 MILLIGRAM(S): 50 TABLET ORAL at 22:00

## 2019-11-09 RX ADMIN — TRAMADOL HYDROCHLORIDE 50 MILLIGRAM(S): 50 TABLET ORAL at 03:43

## 2019-11-09 RX ADMIN — Medication 650 MILLIGRAM(S): at 05:00

## 2019-11-09 RX ADMIN — Medication 20 MILLIEQUIVALENT(S): at 13:22

## 2019-11-09 RX ADMIN — Medication 650 MILLIGRAM(S): at 10:33

## 2019-11-09 RX ADMIN — TRAMADOL HYDROCHLORIDE 50 MILLIGRAM(S): 50 TABLET ORAL at 04:15

## 2019-11-09 RX ADMIN — Medication 1 TABLET(S): at 12:03

## 2019-11-09 RX ADMIN — Medication 650 MILLIGRAM(S): at 11:03

## 2019-11-09 RX ADMIN — TRAMADOL HYDROCHLORIDE 50 MILLIGRAM(S): 50 TABLET ORAL at 21:35

## 2019-11-09 RX ADMIN — HYDROMORPHONE HYDROCHLORIDE 0.5 MILLIGRAM(S): 2 INJECTION INTRAMUSCULAR; INTRAVENOUS; SUBCUTANEOUS at 18:00

## 2019-11-09 RX ADMIN — HYDROMORPHONE HYDROCHLORIDE 0.5 MILLIGRAM(S): 2 INJECTION INTRAMUSCULAR; INTRAVENOUS; SUBCUTANEOUS at 17:44

## 2019-11-09 RX ADMIN — Medication 1 TABLET(S): at 05:20

## 2019-11-09 RX ADMIN — Medication 650 MILLIGRAM(S): at 17:32

## 2019-11-09 RX ADMIN — Medication 2 MILLIGRAM(S): at 12:03

## 2019-11-09 RX ADMIN — Medication 1 TABLET(S): at 21:35

## 2019-11-09 RX ADMIN — TRAMADOL HYDROCHLORIDE 50 MILLIGRAM(S): 50 TABLET ORAL at 15:44

## 2019-11-09 RX ADMIN — TRAMADOL HYDROCHLORIDE 50 MILLIGRAM(S): 50 TABLET ORAL at 10:33

## 2019-11-09 RX ADMIN — LACTULOSE 20 GRAM(S): 10 SOLUTION ORAL at 05:20

## 2019-11-09 RX ADMIN — TRAMADOL HYDROCHLORIDE 50 MILLIGRAM(S): 50 TABLET ORAL at 11:03

## 2019-11-09 RX ADMIN — Medication 1 MILLIGRAM(S): at 12:03

## 2019-11-09 RX ADMIN — Medication 1 TABLET(S): at 13:22

## 2019-11-09 RX ADMIN — GABAPENTIN 400 MILLIGRAM(S): 400 CAPSULE ORAL at 21:35

## 2019-11-09 RX ADMIN — Medication 2 MILLIGRAM(S): at 05:20

## 2019-11-09 RX ADMIN — TRAMADOL HYDROCHLORIDE 50 MILLIGRAM(S): 50 TABLET ORAL at 16:15

## 2019-11-09 RX ADMIN — Medication 2 MILLIGRAM(S): at 17:32

## 2019-11-09 RX ADMIN — Medication 650 MILLIGRAM(S): at 04:18

## 2019-11-09 NOTE — PROGRESS NOTE ADULT - ATTENDING COMMENTS
Pt is alert, awake in NAD.  She is feeling better and not in a lot of pain. HV was 30 cc.  She has been ambulating.  Afebrile, RENEE, wound intact, HV out.  Hct 27 after 2 u PRBC.  Continue to observe vital signs and will obtain repeat Hct tomorrow.  If acceptable, will discharge home with follow up in office.  Instructions given.
Pt seen earlier this afternoon. She is feeling well except for some incisional pain.  Walked with walker down demarco.  She briefly felt dizzy when she first got up.  Right hip radicular pain is not there anymore.  HV with >200 cc since surgery.  Hct 27.  Alert, RENEE well, sitting in chair with LSO brace.  Chart HV output and HCT, CT LS spine, Continue to ambulate, FeSO4 supplement.
Pt with back pain today.  She is out of bed this evening with the brace.  She has been feeling light headed.  HR mildly tachycardic.  HCt 21.  Afebrile.  RENEE.  Pt to receive 2 u PRBC.   CT lumbar pending.  D/c lovenox for now due to HV output overnight 260 cc.  Will continue to chart output of HV and response to blood transfusion.
Dr. Eri Kevin  Division of Hospital Medicine  [facility]   Pager: [phone]
doesn't want me to call PMD

## 2019-11-09 NOTE — PROGRESS NOTE ADULT - PROBLEM SELECTOR PLAN 2
s/p 2u PRBCS for symptomatic anemia on 11/8 with appropriate response. Hb improved. minimal output from drain today. continue to monitor.

## 2019-11-09 NOTE — PROGRESS NOTE ADULT - ASSESSMENT
68 yo female h/o spondylosis S/P L4-5 PLIF on 11/6/19.  cc.
HPI:  68 yo female. c/o progressively worsening low back pain with radiculopathy to right hip. pain is constant, 6-10/10, aggravated with movements, "it's pain". MRI revealed severe L45 stenosis with multilevel spondylosis. now presents to PST scheduled for laminectomy and fusion. (28 Oct 2019 11:00)    PROCEDURE: Posterior lumbar interbody fusion (PLIF)  Lumbar facetectomy  Lumbar laminectomy   s/p L4-L5 PLIF-11/6   POD#  2  PLAN:  1 Out of bed   2 tlso brace for comfort and support   3 continue pt   4 post op anemia -2u PRBC :P   5 CBC in am   6 prn pain meds   7 standing muscle relaxant   8 continue drain   9 continue gabapentin   10 dvt ppx scds only for now due to drop in h/h -will resume lovenox once h/h stable   11 dispo : home once stable     Assessment:  Please Check When Present   []  GCS  E   V  M     Heart Failure: []Acute, [] acute on chronic , []chronic  Heart Failure:  [] Diastolic (HFpEF), [] Systolic (HFrEF), []Combined (HFpEF and HFrEF), [] RHF, [] Pulm HTN, [] Other    [] TIFFANIE, [] ATN, [] AIN, [] other  [] CKD1, [] CKD2, [] CKD 3, [] CKD 4, [] CKD 5, []ESRD    Encephalopathy: [] Metabolic, [] Hepatic, [] toxic, [] Neurological, [] Other    Abnormal Nurtitional Status: [] malnurtition (see nutrition note), [ ]underweight: BMI < 19, [] morbid obesity: BMI >40, [] Cachexia    [] Sepsis  [] hypovolemic shock,[] cardiogenic shock, [] hemorrhagic shock, [] neuogenic shock  [] Acute Respiratory Failure  []Cerebral edema, [] Brain compression/ herniation,   [] Functional quadriplegia  [] Acute blood loss anemia
HPI:  70 yo female c/o progressively worsening low back pain with radiculopathy to right hip. Pain is constant, 6-10/10, aggravated with movements.  MRI revealed severe L45 stenosis with multilevel spondylosis.     PROCEDURE: 11/6 S/P L4-5 PLIF     POD# 1    PLAN:  NEURO:   -D/C PCA  -Oral, PRN analgesics  -increase activity as tolerated  -empty and record HMV output    PULM:   -room air  -incentive spirometry    HEME/ONC:    -H/H stable               DVT ppx: [X] SQL [] SQH [X] Venodynes bilaterally     RENAL:   -IVL    ID:   -afebrile    GI:   -regular diet  -bowel regimen    DISCHARGE PLANNING: PT-p      Assessment:  Please Check When Present   []  GCS  E   V  M     Heart Failure: []Acute, [] acute on chronic , []chronic  Heart Failure:  [] Diastolic (HFpEF), [] Systolic (HFrEF), []Combined (HFpEF and HFrEF), [] RHF, [] Pulm HTN, [] Other    [] TIFFANIE, [] ATN, [] AIN, [] other  [] CKD1, [] CKD2, [] CKD 3, [] CKD 4, [] CKD 5, []ESRD    Encephalopathy: [] Metabolic, [] Hepatic, [] toxic, [] Neurological, [] Other    Abnormal Nurtitional Status: [] malnurtition (see nutrition note), [ ]underweight: BMI < 19, [] morbid obesity: BMI >40, [] Cachexia    [] Sepsis  [] hypovolemic shock,[] cardiogenic shock, [] hemorrhagic shock, [] neuogenic shock  [] Acute Respiratory Failure  []Cerebral edema, [] Brain compression/ herniation,   [] Functional quadriplegia  [] Acute blood loss anemia    Will discuss plan with Dr. Shanice Barbour # [id number]
HPI:  70 yo female. c/o progressively worsening low back pain with radiculopathy to right hip. pain is constant, 6-10/10, aggravated with movements, "it's pain". MRI revealed severe L45 stenosis with multilevel spondylosis. now presents to PST scheduled for laminectomy and fusion. (28 Oct 2019 11:00)    PROCEDURE: Posterior lumbar interbody fusion (PLIF)  Lumbar facetectomy  Lumbar laminectomy   s/p L4-L5 posterior lumbar interbody fusion on 11/6   POD#  3   PLAN:  1 Out of bed   2 continue physical therapy   3 prn pain meds   4 valium for muscle spasm   5 cbc in am   6 ct lspine :p   7 regular diet   8 stool softeners   9 dispo : home once medically stable   Assessment:  Please Check When Present   []  GCS  E   V  M     Heart Failure: []Acute, [] acute on chronic , []chronic  Heart Failure:  [] Diastolic (HFpEF), [] Systolic (HFrEF), []Combined (HFpEF and HFrEF), [] RHF, [] Pulm HTN, [] Other    [] TIFFANIE, [] ATN, [] AIN, [] other  [] CKD1, [] CKD2, [] CKD 3, [] CKD 4, [] CKD 5, []ESRD    Encephalopathy: [] Metabolic, [] Hepatic, [] toxic, [] Neurological, [] Other    Abnormal Nurtitional Status: [] malnurtition (see nutrition note), [ ]underweight: BMI < 19, [] morbid obesity: BMI >40, [] Cachexia    [] Sepsis  [] hypovolemic shock,[] cardiogenic shock, [] hemorrhagic shock, [] neuogenic shock  [] Acute Respiratory Failure  []Cerebral edema, [] Brain compression/ herniation,   [] Functional quadriplegia  [] Acute blood loss anemia
70 yo female h/o spondylosis S/P L4-5 PLIF on 11/6/19.  cc.

## 2019-11-09 NOTE — PROGRESS NOTE ADULT - SUBJECTIVE AND OBJECTIVE BOX
SUBJECTIVE:   Patient was seen and evaluated at bedside. Patient is resting in bed and is in no new acute distress.   OVERNIGHT EVENTS:   none   Vital Signs Last 24 Hrs  T(C): 36.7 (09 Nov 2019 08:16), Max: 38.1 (08 Nov 2019 21:50)  T(F): 98 (09 Nov 2019 08:16), Max: 100.6 (08 Nov 2019 21:50)  HR: 89 (09 Nov 2019 09:36) (89 - 106)  BP: 151/79 (09 Nov 2019 09:36) (110/62 - 151/79)  BP(mean): --  RR: 18 (09 Nov 2019 08:16) (16 - 18)  SpO2: 97% (09 Nov 2019 09:36) (94% - 98%)    PHYSICAL EXAM:    General: No Acute Distress     Neurological: Awake, alert oriented to person, place and time, Following Commands, PERRL, EOMI, Face Symmetrical, Speech Fluent, Moving all extremities, Muscle Strength normal in all four extremities, No Drift, Sensation to Light Touch Intact    Pulmonary: Clear to Auscultation, No Rales, No Rhonchi, No Wheezes     Cardiovascular: S1, S2, Regular Rate and Rhythm     Gastrointestinal: Soft, Nontender, Nondistended     Incision:   clean and dry   LABS:                        9.2    9.37  )-----------( 178      ( 09 Nov 2019 07:20 )             27.2    11-09    136  |  101  |  7   ----------------------------<  116<H>  3.5   |  25  |  0.56    Ca    9.5      09 Nov 2019 07:20          11-08 @ 07:01  -  11-09 @ 07:00  --------------------------------------------------------  IN: 1320 mL / OUT: 32 mL / NET: 1288 mL    11-09 @ 07:01 - 11-09 @ 09:59  --------------------------------------------------------  IN: 0 mL / OUT: 550 mL / NET: -550 mL      DRAINS:   hmv 30 cc   MEDICATIONS:  Antibiotics:    Neuro:  acetaminophen   Tablet .. 650 milliGRAM(s) Oral every 6 hours PRN Mild Pain (1 - 3)  diazepam    Tablet 2 milliGRAM(s) Oral every 6 hours  gabapentin 400 milliGRAM(s) Oral three times a day  traMADol 25 milliGRAM(s) Oral every 4 hours PRN Moderate Pain (4 - 6)  traMADol 50 milliGRAM(s) Oral every 4 hours PRN Severe Pain (7 - 10)    Cardiac:    Pulm:    GI/:  lactulose Syrup 20 Gram(s) Oral two times a day  polyethylene glycol 3350 17 Gram(s) Oral daily  senna 2 Tablet(s) Oral at bedtime    Other:   calcium carbonate 1250 mG  + Vitamin D (OsCal 500 + D) 1 Tablet(s) Oral three times a day  folic acid 1 milliGRAM(s) Oral daily  multivitamin 1 Tablet(s) Oral daily  potassium chloride    Tablet ER 20 milliEquivalent(s) Oral every 2 hours    DIET: [] Regular [] CCD [] Renal [] Puree [] Dysphagia [] Tube Feeds: regular     IMAGING: ct ernestina :salomón

## 2019-11-09 NOTE — CHART NOTE - NSCHARTNOTEFT_GEN_A_CORE
Patient was seen at bedside. Patient's drain was removed and dressing was applied. Patient tolerated it well.

## 2019-11-09 NOTE — PROGRESS NOTE ADULT - SUBJECTIVE AND OBJECTIVE BOX
[facility] Division of Hospital Medicine  Eri Kevin MD  Pager (XENIA-KIM, 0Z-5M): [phone]  Other Times:  [phone]      Patient is a 69y old  Female who presents with a chief complaint of Patient was admitted with worsening low back pain (09 Nov 2019 09:58)      SUBJECTIVE / OVERNIGHT EVENTS: No acute events overnight. Patient s/p 2u PRBCs yesterday with appropriate response with Hb now 9.2 this AM. T 100.6 during transfusion, with no further episodes since. No chills, chest pain, SOB, abd pain, n/v/d/c at this time. Endorses some ?tightness/strain at hemovac site with standing but otherwise without any complaints at this time.      MEDICATIONS  (STANDING):  calcium carbonate 1250 mG  + Vitamin D (OsCal 500 + D) 1 Tablet(s) Oral three times a day  diazepam    Tablet 2 milliGRAM(s) Oral every 6 hours  folic acid 1 milliGRAM(s) Oral daily  gabapentin 400 milliGRAM(s) Oral three times a day  lactulose Syrup 20 Gram(s) Oral two times a day  multivitamin 1 Tablet(s) Oral daily  polyethylene glycol 3350 17 Gram(s) Oral daily  potassium chloride    Tablet ER 20 milliEquivalent(s) Oral every 2 hours  senna 2 Tablet(s) Oral at bedtime    MEDICATIONS  (PRN):  acetaminophen   Tablet .. 650 milliGRAM(s) Oral every 6 hours PRN Mild Pain (1 - 3)  traMADol 25 milliGRAM(s) Oral every 4 hours PRN Moderate Pain (4 - 6)  traMADol 50 milliGRAM(s) Oral every 4 hours PRN Severe Pain (7 - 10)      CAPILLARY BLOOD GLUCOSE        I&O's Summary    08 Nov 2019 07:01  -  09 Nov 2019 07:00  --------------------------------------------------------  IN: 1320 mL / OUT: 32 mL / NET: 1288 mL    09 Nov 2019 07:01  -  09 Nov 2019 10:13  --------------------------------------------------------  IN: 0 mL / OUT: 550 mL / NET: -550 mL        PHYSICAL EXAM:  Vital Signs Last 24 Hrs  T(C): 36.7 (09 Nov 2019 08:16), Max: 38.1 (08 Nov 2019 21:50)  T(F): 98 (09 Nov 2019 08:16), Max: 100.6 (08 Nov 2019 21:50)  HR: 89 (09 Nov 2019 09:36) (89 - 106)  BP: 151/79 (09 Nov 2019 09:36) (110/62 - 151/79)  BP(mean): --  RR: 18 (09 Nov 2019 08:16) (16 - 18)  SpO2: 97% (09 Nov 2019 09:36) (94% - 98%)    CONSTITUTIONAL: NAD, well-developed, well-groomed  EYES: PERRLA; conjunctiva and sclera clear  ENMT: Moist oral mucosa, no pharyngeal injection or exudates; normal dentition  NECK: Supple, no palpable masses; no thyromegaly  RESPIRATORY: Normal respiratory effort; lungs are clear to auscultation bilaterally  CARDIOVASCULAR: Regular rate and rhythm, normal S1 and S2, no murmur/rub/gallop; No lower extremity edema; Peripheral pulses are 2+ bilaterally  ABDOMEN: Nontender to palpation, normoactive bowel sounds, no rebound/guarding; No hepatosplenomegaly  MUSCULOSKELETAL:  +TLSO brace, +hemovac in place with minimal drainage, +small fluid collection surrounding drain site, non-tender  PSYCH: A+O to person, place, and time; affect appropriate  NEUROLOGY: CN 2-12 are intact and symmetric; no gross sensory deficits   SKIN: No rashes; no palpable lesions    LABS:                        9.2    9.37  )-----------( 178      ( 09 Nov 2019 07:20 )             27.2     11-09    136  |  101  |  7   ----------------------------<  116<H>  3.5   |  25  |  0.56    Ca    9.5      09 Nov 2019 07:20                  RADIOLOGY & ADDITIONAL TESTS:  Results Reviewed: Hb with appropriate response s/p 2u PRBCs now 9.2 this AM.  Imaging Personally Reviewed:  Electrocardiogram Personally Reviewed:    COORDINATION OF CARE:  Care Discussed with Consultants/Other Providers [Y]: Neurosurgery SHLOMO Diaz  Prior or Outpatient Records Reviewed [Y/N]:

## 2019-11-10 ENCOUNTER — TRANSCRIPTION ENCOUNTER (OUTPATIENT)
Age: 69
End: 2019-11-10

## 2019-11-10 VITALS
HEART RATE: 90 BPM | OXYGEN SATURATION: 95 % | TEMPERATURE: 98 F | RESPIRATION RATE: 18 BRPM | DIASTOLIC BLOOD PRESSURE: 66 MMHG | SYSTOLIC BLOOD PRESSURE: 106 MMHG

## 2019-11-10 LAB
HCT VFR BLD CALC: 29.3 % — LOW (ref 34.5–45)
HGB BLD-MCNC: 9.7 G/DL — LOW (ref 11.5–15.5)
MCHC RBC-ENTMCNC: 29.4 PG — SIGNIFICANT CHANGE UP (ref 27–34)
MCHC RBC-ENTMCNC: 33.1 GM/DL — SIGNIFICANT CHANGE UP (ref 32–36)
MCV RBC AUTO: 88.8 FL — SIGNIFICANT CHANGE UP (ref 80–100)
NRBC # BLD: 0 /100 WBCS — SIGNIFICANT CHANGE UP (ref 0–0)
PLATELET # BLD AUTO: 215 K/UL — SIGNIFICANT CHANGE UP (ref 150–400)
RBC # BLD: 3.3 M/UL — LOW (ref 3.8–5.2)
RBC # FLD: 14.6 % — HIGH (ref 10.3–14.5)
WBC # BLD: 7.12 K/UL — SIGNIFICANT CHANGE UP (ref 3.8–10.5)
WBC # FLD AUTO: 7.12 K/UL — SIGNIFICANT CHANGE UP (ref 3.8–10.5)

## 2019-11-10 RX ORDER — DIAZEPAM 5 MG
1 TABLET ORAL
Qty: 10 | Refills: 0
Start: 2019-11-10 | End: 2019-11-14

## 2019-11-10 RX ORDER — TRAMADOL HYDROCHLORIDE 50 MG/1
1 TABLET ORAL
Qty: 42 | Refills: 0
Start: 2019-11-10 | End: 2019-11-16

## 2019-11-10 RX ORDER — TRAMADOL HYDROCHLORIDE 50 MG/1
1 TABLET ORAL
Qty: 0 | Refills: 0 | DISCHARGE

## 2019-11-10 RX ADMIN — GABAPENTIN 400 MILLIGRAM(S): 400 CAPSULE ORAL at 06:24

## 2019-11-10 RX ADMIN — Medication 650 MILLIGRAM(S): at 10:25

## 2019-11-10 RX ADMIN — LACTULOSE 20 GRAM(S): 10 SOLUTION ORAL at 06:24

## 2019-11-10 RX ADMIN — Medication 2 MILLIGRAM(S): at 00:12

## 2019-11-10 RX ADMIN — Medication 1 TABLET(S): at 06:24

## 2019-11-10 RX ADMIN — TRAMADOL HYDROCHLORIDE 50 MILLIGRAM(S): 50 TABLET ORAL at 03:52

## 2019-11-10 RX ADMIN — TRAMADOL HYDROCHLORIDE 50 MILLIGRAM(S): 50 TABLET ORAL at 04:20

## 2019-11-10 RX ADMIN — Medication 650 MILLIGRAM(S): at 00:09

## 2019-11-10 RX ADMIN — Medication 650 MILLIGRAM(S): at 00:54

## 2019-11-10 RX ADMIN — Medication 2 MILLIGRAM(S): at 06:24

## 2019-11-10 NOTE — PROVIDER CONTACT NOTE (OTHER) - ACTION/TREATMENT ORDERED:
NNO, Monitor Pt at this time/PP/RN
NSX PA/Darwin made aware, NNO at this time/PP/RN
cont to monitor, MD will see pt in AM
no further action needed at this time, MD to speak with team in AM.
cont to monitor, PO Tylenol if fever persist as per MD.

## 2019-11-10 NOTE — DISCHARGE NOTE PROVIDER - HOSPITAL COURSE
You were admitted on 11/6 for a L4-5 Posterior lumbar interbody and fusion. No complications. No postoperative imaging was obtained. Pain was controlled and your hemoglobin levels were followed until they normalized. Outpatient physical therapy was recommended.

## 2019-11-10 NOTE — DISCHARGE NOTE PROVIDER - NSDCMRMEDTOKEN_GEN_ALL_CORE_FT
gabapentin 400 mg oral tablet: 400 milligram(s) orally 3 times a day  standard rolling walker : dispense #1  use as needed   standard rolling walker: dispense #1  use as needed   TLSO brace: Use as directed  traMADol 50 mg oral tablet: 1 tab(s) orally 2 times a day, As Needed  Tylenol 500 mg oral tablet: 2 tab(s) orally every 8 hours, As Needed  Valium 2 mg oral tablet: 1 tab(s) orally 2 times a day MDD:2 pills gabapentin 400 mg oral tablet: 400 milligram(s) orally 3 times a day  standard rolling walker: dispense #1  use as needed   traMADol 50 mg oral tablet: 1 tab(s) orally every 4 hours, As Needed MDD:6  Tylenol 500 mg oral tablet: 2 tab(s) orally every 8 hours, As Needed  Valium 2 mg oral tablet: 1 tab(s) orally 2 times a day MDD:2 pills

## 2019-11-10 NOTE — DISCHARGE NOTE PROVIDER - CARE PROVIDER_API CALL
Hilario Prasad)  Neurological Surgery  [address] [place] [address], [address] [place] [address]  Phone: [phone]  Fax: [phone]  Follow Up Time: 1 week

## 2019-11-10 NOTE — PROVIDER CONTACT NOTE (OTHER) - ASSESSMENT
Pt was transported to CT via stretcher. Pt refused to lay supine on for procedure, pt states that she cannot lay flat b/c of the pain. Pt was premedicated before transport.

## 2019-11-11 PROBLEM — M47.816 SPONDYLOSIS WITHOUT MYELOPATHY OR RADICULOPATHY, LUMBAR REGION: Chronic | Status: ACTIVE | Noted: 2019-10-28

## 2019-11-12 PROCEDURE — 97162 PT EVAL MOD COMPLEX 30 MIN: CPT

## 2019-11-12 PROCEDURE — 76000 FLUOROSCOPY <1 HR PHYS/QHP: CPT

## 2019-11-12 PROCEDURE — 84132 ASSAY OF SERUM POTASSIUM: CPT

## 2019-11-12 PROCEDURE — 86923 COMPATIBILITY TEST ELECTRIC: CPT

## 2019-11-12 PROCEDURE — P9041: CPT

## 2019-11-12 PROCEDURE — P9016: CPT

## 2019-11-12 PROCEDURE — 86900 BLOOD TYPING SEROLOGIC ABO: CPT

## 2019-11-12 PROCEDURE — 82435 ASSAY OF BLOOD CHLORIDE: CPT

## 2019-11-12 PROCEDURE — 82330 ASSAY OF CALCIUM: CPT

## 2019-11-12 PROCEDURE — 88304 TISSUE EXAM BY PATHOLOGIST: CPT

## 2019-11-12 PROCEDURE — 36430 TRANSFUSION BLD/BLD COMPNT: CPT

## 2019-11-12 PROCEDURE — C1889: CPT

## 2019-11-12 PROCEDURE — 83605 ASSAY OF LACTIC ACID: CPT

## 2019-11-12 PROCEDURE — 85027 COMPLETE CBC AUTOMATED: CPT

## 2019-11-12 PROCEDURE — C1713: CPT

## 2019-11-12 PROCEDURE — 82803 BLOOD GASES ANY COMBINATION: CPT

## 2019-11-12 PROCEDURE — 82565 ASSAY OF CREATININE: CPT

## 2019-11-12 PROCEDURE — 88311 DECALCIFY TISSUE: CPT

## 2019-11-12 PROCEDURE — 85014 HEMATOCRIT: CPT

## 2019-11-12 PROCEDURE — 82947 ASSAY GLUCOSE BLOOD QUANT: CPT

## 2019-11-12 PROCEDURE — 86850 RBC ANTIBODY SCREEN: CPT

## 2019-11-12 PROCEDURE — 97116 GAIT TRAINING THERAPY: CPT

## 2019-11-12 PROCEDURE — C1769: CPT

## 2019-11-12 PROCEDURE — 84295 ASSAY OF SERUM SODIUM: CPT

## 2019-11-12 PROCEDURE — 86901 BLOOD TYPING SEROLOGIC RH(D): CPT

## 2019-11-12 PROCEDURE — 97530 THERAPEUTIC ACTIVITIES: CPT

## 2019-11-12 PROCEDURE — 80048 BASIC METABOLIC PNL TOTAL CA: CPT

## 2019-11-15 LAB — SURGICAL PATHOLOGY STUDY: SIGNIFICANT CHANGE UP

## 2019-11-22 ENCOUNTER — TRANSCRIPTION ENCOUNTER (OUTPATIENT)
Age: 69
End: 2019-11-22

## 2019-12-02 ENCOUNTER — APPOINTMENT (OUTPATIENT)
Dept: NEUROSURGERY | Facility: CLINIC | Age: 69
End: 2019-12-02
Payer: MEDICARE

## 2019-12-02 VITALS
HEIGHT: 64 IN | OXYGEN SATURATION: 96 % | DIASTOLIC BLOOD PRESSURE: 82 MMHG | WEIGHT: 132 LBS | BODY MASS INDEX: 22.53 KG/M2 | SYSTOLIC BLOOD PRESSURE: 174 MMHG

## 2019-12-02 DIAGNOSIS — Z98.1 ARTHRODESIS STATUS: ICD-10-CM

## 2019-12-02 PROCEDURE — 99024 POSTOP FOLLOW-UP VISIT: CPT

## 2019-12-02 NOTE — ASSESSMENT
[FreeTextEntry1] : Impression:  Pt is s/p L45 fusion and intraoperative films reviewed with pt.  She is feeling well, but cautioned about getting a false sense of security and not to do too much.  No heavy lifting and go back to work with breaks and with a progressive plan.\par \par \par PLAN:\par 3 months FU Xray lumbar spine.\par Can start driving, no long distance driving without break.\par Can go back to part time work,no prolonged standing/ sitting/bending, Bending with knees only.\par Followup in 3 months after Xray.\par

## 2019-12-02 NOTE — REASON FOR VISIT
[Follow-Up: _____] : a [unfilled] follow-up visit [Spouse] : spouse [FreeTextEntry1] : 'I am here for follow up after surgery'

## 2019-12-02 NOTE — PHYSICAL EXAM
[General Appearance - Well Nourished] : well nourished [General Appearance - Alert] : alert [General Appearance - Well-Appearing] : healthy appearing [Oriented To Time, Place, And Person] : oriented to person, place, and time [Impaired Insight] : insight and judgment were intact [Affect] : the affect was normal [Memory Recent] : recent memory was not impaired [Person] : oriented to person [Place] : oriented to place [Time] : oriented to time [Motor Strength] : muscle strength was normal in all four extremities [Involuntary Movements] : no involuntary movements were seen [Longitudinal] : longitudinal [Clean] : clean [Dry] : dry [Healing Well] : healing well [Well-Healed] : well-healed [No Drainage] : without drainage [Normal Skin] : normal [Abnormal Walk] : normal gait [Balance] : balance was intact [Motor Tone] : muscle strength and tone were normal [FreeTextEntry1] : lumbar spine [Erythema] : not erythematous [FreeTextEntry6] : a spitting Vicryl was removed.

## 2019-12-02 NOTE — HISTORY OF PRESENT ILLNESS
[FreeTextEntry1] : Ms. SCOTTIE HOGAN is a 69 yrs old female who had L4-L5 laminectomy, facetectomy, foraminotomy and diskectomy with posterior lumbar interbody fusion and posterolateral instrumentation and fusion done on 11/06/2019.\par \par Today Ms. SCOTTIE HOGAN present in the office, ambulatory accompanied with her  for post op evaluation believes that she improved over all. Denies back or hip pain. She feels good, no pain, no numbness or tingling, everything feels great.She wanted to start driving and go back to work.  She states she would go back to work for one day instead of the 2 days part time.  She has not taken any medication now for a couple of weeks.

## 2020-05-04 NOTE — PATIENT PROFILE ADULT - NSPROMEDSPATCH_GEN_A_NUR
Electrophysiology/ Cardiology- Consult Note         H&P and Plan:     Reason for consult: Tachycardia.      History of present illness: 24 year old lady with a history of Hodgkin's  lymphoma, who was admitted with shortness of breath and possible hematemesis.  Hospital stay, was complicated by tachycardia (narrow and wide complex tachycardia), and EP was consult.     Patient was admitted after multiple days of nausea and vomiting, and possible hematemesis at home.  During admission, she was found to have increase potassium (6.1) and lactic acidosis (pH of 7.1) with low bicarb (8). She was found to be in respiratory distress and tachycardiac.  A BiPAP was placed and COVID 19 was ruled out.      Heart rate ranged from 140-160s.  Initially, she was thought to be in SVT and adenosine was attempted to convert to normal rhythm.  However, adenosine was unsuccessful.  Additionally, she exhibited a wide-complex tachycardia.  Therefore, EP was consulted to help with management.     At the moment, she is doing better.  No longer requiring BiPAP as her pH has improved.  She denies any symptoms of chest pain, shortness of breath, near-syncope or syncope.    I reviewed telemetry and EKGs available.  EKG suggestive of sinus tachycardia.  Wide complex tachycardia exhibited a left bundle branch block pattern and was likely associated with sinus tachycardia with aberrancy as you can see QRS narrowing throughout the tachycardia tracing.    Of note, atrial tachycardia cannot be completely excluded.  However patient exhibited slowing heart rate at night (heart rate ranged from 100 bpm during sleep compared to 130-140 bpm during awaking hours), which suggests sinus tachycardia rather than atrial tachycardia.    Previous studies:  -Echocardiogram (05/03/2020): Severe LV dysfunction.  EF estimated at 20-25%.  Severe global hyopkinesis with hyperdyanic function at the bases.  Appearance consistent with stress cardiomyopathy. There was mild  "(1+) mitral regurgitation.       Plan:  1.  Tachycardia.  Elevated heart rates are likely related to sinus tachycardia in the setting of CHF/acidosis. The episode of wide-complex tachycardia was likely related to LBBB aberrancy.  Heart rate should improve with improvement in clinical picture.       We will sign off.      Luis Javier MD    Physical Exam:  Vitals: BP 90/76   Pulse 168   Temp 97.5  F (36.4  C) (Axillary)   Resp 20   Ht 1.6 m (5' 2.99\")   Wt 55.8 kg (123 lb 0.3 oz)   SpO2 100%   BMI 21.80 kg/m        Intake/Output Summary (Last 24 hours) at 5/4/2020 0842  Last data filed at 5/4/2020 0800  Gross per 24 hour   Intake 3956.96 ml   Output 2125 ml   Net 1831.96 ml     Vitals:    05/03/20 0057 05/03/20 0730 05/04/20 0500   Weight: 59 kg (130 lb) 55.6 kg (122 lb 9.2 oz) 55.8 kg (123 lb 0.3 oz)       Constitutional:  AAO x3.  Pt is in NAD.  HEAD: Normocephalic.  SKIN: Skin normal color, texture and turgor with no lesions or eruptions.  Eyes: PERRL, EOMI.  ENT:  Supple, normal JVP. No lymphadenopathy or thyroid enlargement.  Chest:  CTAB.  Cardiac: RRR, normal  S1 and S2.  No murmurs rubs or gallop.  Abdomen:  Normal BS.  Soft, non-tender and non-distended.  No rebound or guarding.    Extremities:  Pedious pulses palpable B/L.  No LE edema noticed.   Neurological: Strength and sensation grossly symmetric and intact throughout.         Review of Systems:  Complete review of system is otherwise negative with the exception of what was described above.     CURRENT MEDICATIONS:    [START ON 5/6/2020] folic acid  1 mg Oral Daily     folic acid  1 mg Intravenous Daily     [START ON 5/10/2020] gabapentin  100 mg Oral Q8H     [START ON 5/8/2020] gabapentin  300 mg Oral Q8H     [START ON 5/6/2020] gabapentin  600 mg Oral Q8H     gabapentin  900 mg Oral Q8H     levETIRAcetam  1,000 mg Intravenous Q12H     multivitamin w/minerals  1 tablet Oral Daily     pantoprazole (PROTONIX) IV  40 mg Intravenous Daily with " breakfast     piperacillin-tazobactam  3.375 g Intravenous Q6H     thiamine  200 mg Intravenous TID     [START ON 5/5/2020] thiamine  100 mg Oral TID     [START ON 5/10/2020] thiamine  100 mg Oral Daily     PRN Meds: acetaminophen **OR** acetaminophen, benzocaine 20%, bisacodyl, dextrose, dextrose 5% and 0.45% NaCl, glucose **OR** dextrose **OR** glucagon, flumazenil, haloperidol lactate, heparin lock flush, lidocaine 4%, lidocaine (buffered or not buffered), LORazepam, LORazepam **OR** LORazepam, LORazepam, magnesium sulfate, magnesium sulfate, - MEDICATION INSTRUCTIONS -, metoprolol, naloxone, polyethylene glycol, potassium chloride, potassium chloride with lidocaine, potassium chloride, potassium chloride, potassium chloride, potassium phosphate (KPHOS) in D5W IV, potassium phosphate (KPHOS) in D5W IV, potassium phosphate (KPHOS) in D5W IV, potassium phosphate (KPHOS) in D5W IV, senna-docusate **OR** senna-docusate    ALLERGIES     Allergies   Allergen Reactions     Asa [Aspirin] Angioedema and Hives     Hydrocodone      Hallucinations       PAST MEDICAL HISTORY:  Past Medical History:   Diagnosis Date     Hodgkin's lymphoma (H)        PAST SURGICAL HISTORY:  Past Surgical History:   Procedure Laterality Date     CHOLECYSTECTOMY         FAMILY HISTORY:  No family history on file.    SOCIAL HISTORY:  Social History     Socioeconomic History     Marital status: Single     Spouse name: Not on file     Number of children: Not on file     Years of education: Not on file     Highest education level: Not on file   Occupational History     Not on file   Social Needs     Financial resource strain: Not on file     Food insecurity     Worry: Not on file     Inability: Not on file     Transportation needs     Medical: Not on file     Non-medical: Not on file   Tobacco Use     Smoking status: Never Smoker     Smokeless tobacco: Never Used   Substance and Sexual Activity     Alcohol use: Yes     Comment: 1-2/week     Drug use:  No     Sexual activity: Not on file   Lifestyle     Physical activity     Days per week: Not on file     Minutes per session: Not on file     Stress: Not on file   Relationships     Social connections     Talks on phone: Not on file     Gets together: Not on file     Attends Anabaptist service: Not on file     Active member of club or organization: Not on file     Attends meetings of clubs or organizations: Not on file     Relationship status: Not on file     Intimate partner violence     Fear of current or ex partner: Not on file     Emotionally abused: Not on file     Physically abused: Not on file     Forced sexual activity: Not on file   Other Topics Concern     Not on file   Social History Narrative     Not on file         Recent Lab Results:  Recent Labs   Lab 05/04/20  0535 05/04/20  0506 05/04/20  0000 05/03/20  1810 05/03/20  1600 05/03/20  1213 05/03/20  0809  05/03/20  0145   WBC  --  12.2* 10.4 3.3*  --   --   --   --  13.1*   HGB  --  13.3 14.9 13.9  --  13.1  --   --  16.8*   MCV  --  95 94 94  --   --   --   --  99   PLT  --  186 205 203  --   --   --   --  338   INR  --   --   --   --   --   --   --   --  1.26*     --  132* 132*  --  134  --    < > 127*   POTASSIUM 3.8  --  3.2* 3.4  --  3.7  --    < > 6.1*   CHLORIDE 102  --  99 101  --  103  --    < > 93*   CO2 24  --  23 22  --  19*  --    < > 8*   BUN 3*  --  4* 5*  --  7  --    < > 17   CR 0.62  --  0.56 0.50*  --  0.66  --    < > 0.96   ANIONGAP 8  --  10 9  --  12  --    < > 26*   DONOVAN 7.8*  --  8.0* 7.8*  --  8.4*  --    < > 8.4*   *  --  125* 148*  --  179*  --    < > 356*   ALBUMIN 3.3*  --   --   --   --   --   --   --  5.1*   PROTTOTAL 6.8  --   --   --   --   --   --   --  10.2*   BILITOTAL 1.0  --   --   --   --   --   --   --  1.0   ALKPHOS 50  --   --   --   --   --   --   --  85   ALT 28  --   --   --   --   --   --   --  46   AST 52*  --   --   --   --   --   --   --  68*   LIPASE  --   --   --   --   --   --   --   --   141   TROPI  --   --   --   --  0.746* 0.498* 0.076*  --  <0.015    < > = values in this interval not displayed.                      none

## 2022-05-13 NOTE — PATIENT PROFILE ADULT - NSPRESCRUSEDDRG_GEN_A_NUR
RN NOTE



ELDON BRITTON WENT -190 CALLED  ON  CALL JAIMIE KEMP SHE ORDERED STAT EKG NOTED AND 
CARRIED OUT. No

## 2022-05-26 ENCOUNTER — APPOINTMENT (OUTPATIENT)
Dept: ORTHOPEDIC SURGERY | Facility: CLINIC | Age: 72
End: 2022-05-26
Payer: MEDICARE

## 2022-05-26 VITALS — BODY MASS INDEX: 22.2 KG/M2 | HEIGHT: 64 IN | WEIGHT: 130 LBS

## 2022-05-26 DIAGNOSIS — S82.842A DISPLACED BIMALLEOLAR FRACTURE OF LEFT LOWER LEG, INITIAL ENCOUNTER FOR CLOSED FRACTURE: ICD-10-CM

## 2022-05-26 DIAGNOSIS — Z00.00 ENCOUNTER FOR GENERAL ADULT MEDICAL EXAMINATION W/OUT ABNORMAL FINDINGS: ICD-10-CM

## 2022-05-26 PROCEDURE — 73610 X-RAY EXAM OF ANKLE: CPT | Mod: LT

## 2022-05-26 PROCEDURE — L4361: CPT

## 2022-05-26 PROCEDURE — 99204 OFFICE O/P NEW MOD 45 MIN: CPT | Mod: 57

## 2022-05-26 NOTE — HISTORY OF PRESENT ILLNESS
[Sudden] : sudden [5] : 5 [Sitting] : sitting [Standing] : standing [Walking] : walking [Stairs] : stairs [Retired] : Work status: retired [de-identified] : Pt. is a 72 year old female who presents for evaluation of her LT ankle. Reports twisting injury on stairs 5/5/22. She did not seek care initially and continued all daily activities including gardening. Pain did not resolve and she was evaluated at [facility] 5/22/22 and diagnosed with lateral malleolus fracture. She presents today WB in sneakers without assistive device. Tylenol prn. No previous injury/problem with LT ankle.  [] : no [FreeTextEntry3] : 5-21-22 [FreeTextEntry5] : ankle pain for 3 weeks , outside gardening and slipped on the steps and left foot went under the railing [FreeTextEntry9] : Tylenol [de-identified] : 5-22-22 [de-identified] : [facility] [de-identified] : xr [facility] 5-22-22

## 2022-05-26 NOTE — ASSESSMENT
[FreeTextEntry1] : Patient to be placed in a cam walker.  WB only in cam walker.  No excessive walking/activities.\par Ice/nsaids prn.\par \par Repeat x-ray will be performed at the next office visit.\par

## 2022-05-26 NOTE — PHYSICAL EXAM
[2+] : posterior tibialis pulse: 2+ [Normal] : saphenous nerve sensation normal [Left] : left ankle [Weight -] : weightbearing [Moderate] : moderate swelling of lateral ankle [4___] : eversion 4[unfilled]/5 [] : no pain when stressing lateral tarsal metatarsal joint [FreeTextEntry9] : Healing Maurice A lateral malleolus fracture in acceptable alignment.  Small nondisplaced medial malleolar avulsion fragment. [de-identified] : plantar flexion 30 degrees [de-identified] : inversion 15 degrees [de-identified] : eversion 10 degrees [TWNoteComboBox7] : dorsiflexion 10 degrees

## 2022-07-14 ENCOUNTER — APPOINTMENT (OUTPATIENT)
Dept: ORTHOPEDIC SURGERY | Facility: CLINIC | Age: 72
End: 2022-07-14

## 2022-08-03 NOTE — DISCHARGE NOTE PROVIDER - NSDCFUADDINST_GEN_ALL_CORE_FT
Please see refill request    Patient was last seen on 6-    Last prescribed on 4-  #4 x 0    (Dr Zay Sandy patient)    Thank  you 1. Do not remove steri strips or the mesh dressing directly over the incision.  2. Begin showering with shampoo on post operative day 4. Avoid long soaks and do not submerge incision in water. Regular shower only and allow soap and water to run over the incision. Pat incision area dry with clean towel- do not scrub. Please shower regularly to ensure incision stays clean to avoid post operative infections.   3. Notify your surgeon if you notice increased redness, drainage or you notice incision area opening.   4. Return to ER immediately for high fevers, severe headache, vomiting, lethargy or weakness  5. Please call your neurosurgeon following discharge to make follow up appointment in 1 week after discharge unless otherwise specified. See contact information.  6. Prescription post operative medication has been sent to the pharmacy on file.   7. Ambulate as tolerate. Continue with all "activities of daily living." Avoid strenuous activity or lifting more than 10 pounds until cleared for additional activity at your follow up appointment.  8. Do not return to work or school until cleared by your neurosurgeon at your follow up visit unless specified to you during your hospital stay  9. Do not restart your ASA or anticoagulation/ anti platelets until you follow up with your neurosurgeon, or have gotten their approval.  10. Continue to wear your brace when out of bed until you see your neurosurgeon outpatient

## 2022-10-11 NOTE — BRIEF OPERATIVE NOTE - NSICDXBRIEFPROCEDURE_GEN_ALL_CORE_FT
PROCEDURES:  Posterior lumbar interbody fusion (PLIF) 06-Nov-2019 19:26:32  Kate Ho  Lumbar facetectomy 06-Nov-2019 19:26:17  Kate Ho  Lumbar laminectomy 06-Nov-2019 19:25:50  Kate Ho No

## 2023-07-14 ENCOUNTER — NON-APPOINTMENT (OUTPATIENT)
Age: 73
End: 2023-07-14

## 2023-07-14 ENCOUNTER — APPOINTMENT (OUTPATIENT)
Dept: NEUROLOGY | Facility: CLINIC | Age: 73
End: 2023-07-14
Payer: MEDICARE

## 2023-07-14 VITALS
HEIGHT: 64 IN | DIASTOLIC BLOOD PRESSURE: 70 MMHG | SYSTOLIC BLOOD PRESSURE: 138 MMHG | WEIGHT: 130 LBS | BODY MASS INDEX: 22.2 KG/M2

## 2023-07-14 DIAGNOSIS — G31.84 MILD COGNITIVE IMPAIRMENT, SO STATED: ICD-10-CM

## 2023-07-14 PROCEDURE — 99204 OFFICE O/P NEW MOD 45 MIN: CPT

## 2023-07-14 RX ORDER — TRAMADOL HYDROCHLORIDE 50 MG/1
50 TABLET, COATED ORAL
Qty: 60 | Refills: 0 | Status: COMPLETED | COMMUNITY
Start: 2019-10-07 | End: 2023-07-14

## 2023-07-14 RX ORDER — GABAPENTIN 300 MG/1
300 CAPSULE ORAL 3 TIMES DAILY
Qty: 90 | Refills: 3 | Status: COMPLETED | COMMUNITY
Start: 2019-10-07 | End: 2023-07-14

## 2023-07-14 RX ORDER — METHYLPREDNISOLONE 4 MG/1
4 TABLET ORAL
Qty: 1 | Refills: 0 | Status: COMPLETED | COMMUNITY
Start: 2019-09-23 | End: 2023-07-14

## 2023-07-14 RX ORDER — IBUPROFEN 600 MG/1
600 TABLET, FILM COATED ORAL 3 TIMES DAILY
Qty: 90 | Refills: 3 | Status: COMPLETED | COMMUNITY
Start: 2019-09-23 | End: 2023-07-14

## 2023-07-14 RX ORDER — DIAZEPAM 5 MG/1
5 TABLET ORAL
Qty: 2 | Refills: 0 | Status: COMPLETED | COMMUNITY
Start: 2019-09-23 | End: 2023-07-14

## 2023-07-14 RX ORDER — IBUPROFEN 800 MG
TABLET ORAL
Refills: 0 | Status: COMPLETED | COMMUNITY
End: 2023-07-14

## 2023-07-14 RX ORDER — GABAPENTIN 100 MG/1
100 CAPSULE ORAL
Qty: 90 | Refills: 1 | Status: COMPLETED | COMMUNITY
Start: 2019-10-02 | End: 2023-07-14

## 2023-07-14 RX ORDER — ACETAMINOPHEN 325 MG/1
TABLET, FILM COATED ORAL
Refills: 0 | Status: COMPLETED | COMMUNITY
End: 2023-07-14

## 2023-07-14 NOTE — ASSESSMENT
[FreeTextEntry1] : Her general neurological examination is normal\par She scored 29/30 on Mini-Mental state examination testing\par The history and findings on examination today suggests mild cognitive impairment rather than dementia\par I have explained this to her and her daughter in great detail\par I will review the records from her prior treating neurologist once they become available\par We are increasing the donepezil to the therapeutic 10 mg daily dose\par I have explained in great detail that currently available medications work to help slow down further memory decline rather than reverse existing memory loss and they seem to understand this.\par \par I have stressed the importance of mental and physical activity, adequate hydration, and eating a healthy Mediterranean style diet.\par \par Follow-up in 6 months and by phone prior to that as needed

## 2023-07-14 NOTE — HISTORY OF PRESENT ILLNESS
[FreeTextEntry1] : She is here with her daughter\par Daughter reports that mother has had memory problems going on for about 3 years, slowly progressive.  Patient herself does not feel she has significant problem\par She drives without a problem.  She manages fine with activities of daily living including managing her finances.  She has not done anything that would cause danger to herself or others.  She lives in her house with her children.  Does tend to be repetitive.  She remains active, going out with friends and doing her own shopping.\par \par Says she has had extensive work-up with another neurologist although no records are currently available.  She has been diagnosed with dementia and is currently on donepezil 5 mg a day.  She wants to switch to our practice.  Last visit with her prior neurologist was over 6 months ago.  She has had no problems with the donepezil\par \par Medical history otherwise unremarkable\par \par No smoking or alcohol use.

## 2023-07-14 NOTE — PHYSICAL EXAM
[General Appearance - Alert] : alert [General Appearance - In No Acute Distress] : in no acute distress [General Appearance - Well-Appearing] : healthy appearing [Oriented To Time, Place, And Person] : oriented to person, place, and time [Impaired Insight] : insight and judgment were intact [Affect] : the affect was normal [Mood] : the mood was normal [Total Score ___ / 30] : the patient achieved a score of [unfilled] /30 [Date / Time ___ / 5] : date / time [unfilled] / 5 [Place ___ / 5] : place [unfilled] / 5 [Registration ___ / 3] : registration [unfilled] / 3 [Serial Sevens ___/5] : serial sevens [unfilled] / 5 [Naming 2 Objects ___ / 2] : naming two objects [unfilled] / 2 [Repeating a Sentence ___ / 1] : repeating a sentence [unfilled] / 1 [Writing a Sentence ___ / 1] : write sentence [unfilled] / 1 [3-stage Verbal Command ___ / 3] : three-stage verbal command [unfilled] / 3 [Written Command ___ / 1] : written command [unfilled] / 1 [Copy a Design ___ / 1] : copy a design [unfilled] / 1 [Recall ___ / 3] : recall [unfilled] / 3 [Cranial Nerves Optic (II)] : visual acuity intact bilaterally,  visual fields full to confrontation, pupils equal round and reactive to light [Cranial Nerves Oculomotor (III)] : extraocular motion intact [Cranial Nerves Facial (VII)] : face symmetrical [Cranial Nerves Hypoglossal (XII)] : there was no tongue deviation with protrusion [Motor Tone] : muscle tone was normal in all four extremities [Motor Strength] : muscle strength was normal in all four extremities [Involuntary Movements] : no involuntary movements were seen [Paresis Pronator Drift Right-Sided] : no pronator drift on the right [Paresis Pronator Drift Left-Sided] : no pronator drift on the left [Sensation Pain / Temperature Decrease] : pain and temperature was intact [Romberg's Sign] : Romberg's sign was negtive [Coordination - Dysmetria Impaired Finger-to-Nose Bilateral] : not present [Coordination - Dysmetria Impaired Heel-to-Shin Bilateral] : not present [PERRL With Normal Accommodation] : pupils were equal in size, round, reactive to light, with normal accommodation [Extraocular Movements] : extraocular movements were intact [Full Visual Field] : full visual field

## 2023-07-26 ENCOUNTER — APPOINTMENT (OUTPATIENT)
Dept: NEUROLOGY | Facility: CLINIC | Age: 73
End: 2023-07-26

## 2023-08-21 ENCOUNTER — NON-APPOINTMENT (OUTPATIENT)
Age: 73
End: 2023-08-21

## 2024-01-11 ENCOUNTER — APPOINTMENT (OUTPATIENT)
Dept: NEUROLOGY | Facility: CLINIC | Age: 74
End: 2024-01-11

## 2024-06-11 ENCOUNTER — RX RENEWAL (OUTPATIENT)
Age: 74
End: 2024-06-11

## 2024-06-11 RX ORDER — DONEPEZIL HYDROCHLORIDE 10 MG/1
10 TABLET ORAL DAILY
Qty: 90 | Refills: 0 | Status: ACTIVE | COMMUNITY
Start: 2023-07-14 | End: 1900-01-01

## 2024-06-26 ENCOUNTER — APPOINTMENT (OUTPATIENT)
Dept: NEUROLOGY | Facility: CLINIC | Age: 74
End: 2024-06-26

## 2024-07-02 ENCOUNTER — APPOINTMENT (OUTPATIENT)
Dept: NEUROLOGY | Facility: CLINIC | Age: 74
End: 2024-07-02

## 2024-07-17 ENCOUNTER — APPOINTMENT (OUTPATIENT)
Dept: NEUROLOGY | Facility: CLINIC | Age: 74
End: 2024-07-17

## 2024-10-11 ENCOUNTER — APPOINTMENT (OUTPATIENT)
Dept: NEUROLOGY | Facility: CLINIC | Age: 74
End: 2024-10-11
Payer: MEDICARE

## 2024-10-11 VITALS
DIASTOLIC BLOOD PRESSURE: 78 MMHG | HEIGHT: 63 IN | BODY MASS INDEX: 20.55 KG/M2 | WEIGHT: 116 LBS | HEART RATE: 80 BPM | SYSTOLIC BLOOD PRESSURE: 171 MMHG

## 2024-10-11 DIAGNOSIS — G31.84 MILD COGNITIVE IMPAIRMENT, SO STATED: ICD-10-CM

## 2024-10-11 PROCEDURE — 99215 OFFICE O/P EST HI 40 MIN: CPT

## 2024-10-11 PROCEDURE — G2211 COMPLEX E/M VISIT ADD ON: CPT

## 2024-10-11 RX ORDER — SERTRALINE HYDROCHLORIDE 100 MG/1
100 TABLET, FILM COATED ORAL DAILY
Qty: 30 | Refills: 5 | Status: ACTIVE | COMMUNITY
Start: 2024-10-11 | End: 1900-01-01

## 2024-10-11 RX ORDER — MEMANTINE HYDROCHLORIDE 10 MG/1
10 TABLET, FILM COATED ORAL
Qty: 60 | Refills: 6 | Status: ACTIVE | COMMUNITY
Start: 2024-10-11 | End: 1900-01-01

## 2025-03-07 ENCOUNTER — APPOINTMENT (OUTPATIENT)
Dept: NEUROLOGY | Facility: CLINIC | Age: 75
End: 2025-03-07

## 2025-04-04 ENCOUNTER — APPOINTMENT (OUTPATIENT)
Dept: NEUROLOGY | Facility: CLINIC | Age: 75
End: 2025-04-04
Payer: MEDICARE

## 2025-04-04 VITALS
WEIGHT: 112 LBS | BODY MASS INDEX: 19.12 KG/M2 | SYSTOLIC BLOOD PRESSURE: 170 MMHG | DIASTOLIC BLOOD PRESSURE: 80 MMHG | HEIGHT: 64 IN

## 2025-04-04 DIAGNOSIS — G31.84 MILD COGNITIVE IMPAIRMENT, SO STATED: ICD-10-CM

## 2025-04-04 PROCEDURE — G2211 COMPLEX E/M VISIT ADD ON: CPT

## 2025-04-04 PROCEDURE — 99214 OFFICE O/P EST MOD 30 MIN: CPT

## 2025-08-08 ENCOUNTER — APPOINTMENT (OUTPATIENT)
Dept: DERMATOLOGY | Facility: CLINIC | Age: 75
End: 2025-08-08
Payer: MEDICARE

## 2025-08-08 PROCEDURE — 17110 DESTRUCTION B9 LES UP TO 14: CPT

## 2025-08-08 PROCEDURE — 99202 OFFICE O/P NEW SF 15 MIN: CPT | Mod: 25
